# Patient Record
Sex: FEMALE | Race: WHITE | Employment: FULL TIME | URBAN - METROPOLITAN AREA
[De-identification: names, ages, dates, MRNs, and addresses within clinical notes are randomized per-mention and may not be internally consistent; named-entity substitution may affect disease eponyms.]

---

## 2020-08-05 ENCOUNTER — HOSPITAL ENCOUNTER (OUTPATIENT)
Age: 30
Discharge: HOME | End: 2020-08-05
Attending: FAMILY MEDICINE
Payer: COMMERCIAL

## 2020-08-05 VITALS
WEIGHT: 134 LBS | OXYGEN SATURATION: 100 % | RESPIRATION RATE: 18 BRPM | TEMPERATURE: 99 F | HEIGHT: 69 IN | BODY MASS INDEX: 19.85 KG/M2 | HEART RATE: 98 BPM | DIASTOLIC BLOOD PRESSURE: 90 MMHG | SYSTOLIC BLOOD PRESSURE: 135 MMHG

## 2020-08-05 DIAGNOSIS — L50.9 HIVES: Primary | ICD-10-CM

## 2020-08-05 PROCEDURE — 99202 OFFICE O/P NEW SF 15 MIN: CPT | Performed by: FAMILY MEDICINE

## 2020-08-05 RX ORDER — PREDNISONE 20 MG/1
40 TABLET ORAL DAILY
Qty: 10 TABLET | Refills: 0 | Status: SHIPPED | OUTPATIENT
Start: 2020-08-05 | End: 2020-08-10

## 2020-08-05 NOTE — ED PROVIDER NOTES
Patient Seen in: [facility] [place]      History   Patient presents with:  Rash Skin Problem    Stated Complaint: rash    HPI    Pt is a 32 yo with a 1 week h/o hives on her legs for the cucumbers in her garden.      History revi diagnosis)    Disposition:  Discharge  8/5/2020  6:19 pm    Follow-up:  [address] [facility]  [phone]      As needed          Medications Prescribed:  Current Discharge Medication List    START taking these medications

## 2020-08-05 NOTE — ED INITIAL ASSESSMENT (HPI)
Thinks she maybe allergic to the cucumber vine she has in garden has extensive open lesions some blistered on both posterior legs for at least 4 days

## 2020-08-09 ENCOUNTER — HOSPITAL ENCOUNTER (OUTPATIENT)
Age: 30
Discharge: HOME | End: 2020-08-09
Attending: EMERGENCY MEDICINE
Payer: COMMERCIAL

## 2020-08-09 VITALS
OXYGEN SATURATION: 100 % | WEIGHT: 134 LBS | TEMPERATURE: 99 F | HEART RATE: 122 BPM | RESPIRATION RATE: 22 BRPM | DIASTOLIC BLOOD PRESSURE: 101 MMHG | SYSTOLIC BLOOD PRESSURE: 152 MMHG | HEIGHT: 69 IN | BODY MASS INDEX: 19.85 KG/M2

## 2020-08-09 DIAGNOSIS — L24.9 IRRITANT CONTACT DERMATITIS, UNSPECIFIED TRIGGER: Primary | ICD-10-CM

## 2020-08-09 PROCEDURE — 99213 OFFICE O/P EST LOW 20 MIN: CPT | Performed by: EMERGENCY MEDICINE

## 2020-08-09 RX ORDER — METHYLPREDNISOLONE 4 MG/1
TABLET ORAL
Qty: 1 PACKAGE | Refills: 0 | Status: SHIPPED | OUTPATIENT
Start: 2020-08-09 | End: 2020-10-02 | Stop reason: ALTCHOICE

## 2020-08-09 NOTE — ED NOTES
Start new steroid in am/ use cream today shower with some ismael soap once. Call and make appointment with derm in am and follow up with her.

## 2020-08-09 NOTE — ED INITIAL ASSESSMENT (HPI)
Here 8/5 for rash she got on leg while gardening and felt it was cucumber stings. Given prednisone and hydrocortisone benadryl for itching. Rash continues to spread on both legs up post thighs not hot. None draining.

## 2020-08-09 NOTE — ED PROVIDER NOTES
Patient presents with:  Derm Problem    Stated Complaint: rash     HPI  Patient complains of skin rash for  9 days. Located b/l legs. Describes as itchy red and rough, hasd treatment with prednisone x 5 days, no improvement noted.  Possible exposures inc appreciated  SKIN:  As per ext. exam  PSYCH: calm, cooperative,          ED Course   Labs Reviewed - No data to display    MDM                     Disposition and Plan     Clinical Impression:  Irritant contact dermatitis, unspecified trigger  (primary enc

## 2020-09-25 ENCOUNTER — APPOINTMENT (OUTPATIENT)
Dept: GENERAL RADIOLOGY | Age: 30
DRG: 580 | End: 2020-09-25
Attending: EMERGENCY MEDICINE

## 2020-09-25 ENCOUNTER — HOSPITAL ENCOUNTER (INPATIENT)
Age: 30
LOS: 1 days | Discharge: HOME | DRG: 580 | End: 2020-09-27
Attending: EMERGENCY MEDICINE | Admitting: SURGERY

## 2020-09-25 ENCOUNTER — ANESTHESIA EVENT (OUTPATIENT)
Dept: SURGERY | Age: 30
DRG: 580 | End: 2020-09-25

## 2020-09-25 ENCOUNTER — ANESTHESIA (OUTPATIENT)
Dept: SURGERY | Age: 30
DRG: 580 | End: 2020-09-25

## 2020-09-25 ENCOUNTER — APPOINTMENT (OUTPATIENT)
Dept: CT IMAGING | Age: 30
DRG: 580 | End: 2020-09-25
Attending: EMERGENCY MEDICINE

## 2020-09-25 DIAGNOSIS — S01.01XA LACERATION OF SCALP, INITIAL ENCOUNTER: ICD-10-CM
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
## 2020-09-25 LAB
ABO + RH BLD: NORMAL
AMPHETAMINES UR QL SCN>500 NG/ML: NEGATIVE
ANION GAP SERPL CALC-SCNC: 11 MMOL/L (ref 10–20)
APPEARANCE UR: CLEAR
APTT PPP: 22 SEC (ref 22–32)
BARBITURATES UR QL SCN>200 NG/ML: NEGATIVE
BASOPHILS # BLD: 0.1 K/MCL (ref 0–0.3)
BASOPHILS NFR BLD: 0 %
BENZODIAZ UR QL SCN>200 NG/ML: NEGATIVE
BILIRUB UR QL STRIP: NEGATIVE
BLD GP AB SCN SERPL QL GEL: NEGATIVE
BLOOD BANK CMNT PATIENT-IMP: NORMAL
BUN SERPL-MCNC: 12 MG/DL (ref 6–20)
BUN/CREAT SERPL: 17 (ref 7–25)
BZE UR QL SCN>150 NG/ML: NEGATIVE
CALCIUM SERPL-MCNC: 9.4 MG/DL (ref 8.4–10.2)
CANNABINOIDS UR QL SCN>50 NG/ML: NEGATIVE
CHLORIDE SERPL-SCNC: 106 MMOL/L (ref 98–107)
CO2 SERPL-SCNC: 24 MMOL/L (ref 21–32)
COLOR UR: YELLOW
CREAT SERPL-MCNC: 0.71 MG/DL (ref 0.51–0.95)
CROSSMATCH EXPIRE: NORMAL
DIFFERENTIAL METHOD BLD: ABNORMAL
EOSINOPHIL # BLD: 0.1 K/MCL (ref 0.1–0.5)
EOSINOPHIL NFR BLD: 1 %
ERYTHROCYTE [DISTWIDTH] IN BLOOD: 13.2 % (ref 11–15)
ETHANOL SERPL-MCNC: NORMAL MG/DL
GLUCOSE SERPL-MCNC: 144 MG/DL (ref 65–99)
GLUCOSE UR STRIP-MCNC: NEGATIVE MG/DL
HCG SERPL QL: NEGATIVE
HCT VFR BLD CALC: 35.9 % (ref 36–46.5)
HGB BLD-MCNC: 11.4 G/DL (ref 12–15.5)
HGB UR QL STRIP: NEGATIVE
IMM GRANULOCYTES # BLD AUTO: 0.1 K/MCL (ref 0–0.2)
IMM GRANULOCYTES NFR BLD: 1 %
INR PPP: 1.1
KETONES UR STRIP-MCNC: NEGATIVE MG/DL
LEUKOCYTE ESTERASE UR QL STRIP: NEGATIVE
LYMPHOCYTES # BLD: 3.1 K/MCL (ref 1–4.8)
LYMPHOCYTES NFR BLD: 25 %
MCH RBC QN AUTO: 25.8 PG (ref 26–34)
MCHC RBC AUTO-ENTMCNC: 31.8 G/DL (ref 32–36.5)
MCV RBC AUTO: 81.2 FL (ref 78–100)
MONOCYTES # BLD: 0.7 K/MCL (ref 0.3–0.9)
MONOCYTES NFR BLD: 6 %
NEUTROPHILS # BLD: 8.5 K/MCL (ref 1.8–7.7)
NEUTROPHILS NFR BLD: 67 %
NITRITE UR QL STRIP: NEGATIVE
NRBC BLD MANUAL-RTO: 0 /100 WBC
OPIATES UR QL SCN>300 NG/ML: NEGATIVE
PCP UR QL SCN>25 NG/ML: NEGATIVE
PH UR STRIP: 7 UNITS (ref 5–7)
PLATELET # BLD: 332 K/MCL (ref 140–450)
POTASSIUM SERPL-SCNC: 3.2 MMOL/L (ref 3.4–5.1)
PROT UR STRIP-MCNC: NEGATIVE MG/DL
PROTHROMBIN TIME: 11.2 SEC (ref 9.7–11.8)
RBC # BLD: 4.42 MIL/MCL (ref 4–5.2)
SARS-COV-2 RNA RESP QL NAA+PROBE: NOT DETECTED
SERVICE CMNT-IMP: NORMAL
SODIUM SERPL-SCNC: 138 MMOL/L (ref 135–145)
SP GR UR STRIP: 1.01 (ref 1–1.03)
SPECIMEN SOURCE: NORMAL
SPECIMEN SOURCE: NORMAL
UROBILINOGEN UR STRIP-MCNC: 0.2 MG/DL (ref 0–1)
WBC # BLD: 12.4 K/MCL (ref 4.2–11)

## 2020-09-25 PROCEDURE — 13000002 HB ANESTHESIA  GENERAL  S/U + 1ST 15 MIN: Performed by: SURGERY

## 2020-09-25 PROCEDURE — 10002800 HB RX 250 W HCPCS: Performed by: SURGERY

## 2020-09-25 PROCEDURE — 85025 COMPLETE CBC W/AUTO DIFF WBC: CPT

## 2020-09-25 PROCEDURE — 10002801 HB RX 250 W/O HCPCS: Performed by: ANESTHESIOLOGY

## 2020-09-25 PROCEDURE — 10004281 HB COUNTER-STAFF TIME PER 15 MIN

## 2020-09-25 PROCEDURE — 72125 CT NECK SPINE W/O DYE: CPT

## 2020-09-25 PROCEDURE — 96375 TX/PRO/DX INJ NEW DRUG ADDON: CPT

## 2020-09-25 PROCEDURE — 99291 CRITICAL CARE FIRST HOUR: CPT | Performed by: SURGERY

## 2020-09-25 PROCEDURE — 13003398 CT LUMBAR SPINE 2D REFORMATTED

## 2020-09-25 PROCEDURE — 13152 CMPLX RPR E/N/E/L 2.6-7.5 CM: CPT | Performed by: SURGERY

## 2020-09-25 PROCEDURE — 13121 CMPLX RPR S/A/L 2.6-7.5 CM: CPT | Performed by: SURGERY

## 2020-09-25 PROCEDURE — 83605 ASSAY OF LACTIC ACID: CPT

## 2020-09-25 PROCEDURE — 73080 X-RAY EXAM OF ELBOW: CPT

## 2020-09-25 PROCEDURE — 96374 THER/PROPH/DIAG INJ IV PUSH: CPT

## 2020-09-25 PROCEDURE — 13003394 CT THORACIC SPINE 2D REFORMATTED

## 2020-09-25 PROCEDURE — 13000003 HB ANESTHESIA  GENERAL EA ADD MINUTE: Performed by: SURGERY

## 2020-09-25 PROCEDURE — 85610 PROTHROMBIN TIME: CPT

## 2020-09-25 PROCEDURE — 10002805 HB CONTRAST AGENT: Performed by: EMERGENCY MEDICINE

## 2020-09-25 PROCEDURE — 0KQ10ZZ REPAIR FACIAL MUSCLE, OPEN APPROACH: ICD-10-PCS | Performed by: SURGERY

## 2020-09-25 PROCEDURE — 86901 BLOOD TYPING SEROLOGIC RH(D): CPT

## 2020-09-25 PROCEDURE — 13132 CMPLX RPR F/C/C/M/N/AX/G/H/F: CPT | Performed by: SURGERY

## 2020-09-25 PROCEDURE — 10002800 HB RX 250 W HCPCS: Performed by: EMERGENCY MEDICINE

## 2020-09-25 PROCEDURE — 10003579 HB TRAUMA W/O CRITICAL CARE

## 2020-09-25 PROCEDURE — G0378 HOSPITAL OBSERVATION PER HR: HCPCS

## 2020-09-25 PROCEDURE — 74177 CT ABD & PELVIS W/CONTRAST: CPT

## 2020-09-25 PROCEDURE — 13000035 HB BASIC CASE EA ADD MINUTE: Performed by: SURGERY

## 2020-09-25 PROCEDURE — 13000034 HB BASIC CASE  S/U +1ST 15 MIN: Performed by: SURGERY

## 2020-09-25 PROCEDURE — C9803 HOPD COVID-19 SPEC COLLECT: HCPCS

## 2020-09-25 PROCEDURE — X1094 NO CHARGE VISIT: HCPCS | Performed by: SURGERY

## 2020-09-25 PROCEDURE — 70450 CT HEAD/BRAIN W/O DYE: CPT

## 2020-09-25 PROCEDURE — 10002800 HB RX 250 W HCPCS: Performed by: ANESTHESIOLOGY

## 2020-09-25 PROCEDURE — 81003 URINALYSIS AUTO W/O SCOPE: CPT

## 2020-09-25 PROCEDURE — 10002807 HB RX 258: Performed by: ANESTHESIOLOGY

## 2020-09-25 PROCEDURE — 99285 EMERGENCY DEPT VISIT HI MDM: CPT

## 2020-09-25 PROCEDURE — 85730 THROMBOPLASTIN TIME PARTIAL: CPT

## 2020-09-25 PROCEDURE — 10004451 HB PACU RECOVERY 1ST 30 MINUTES: Performed by: SURGERY

## 2020-09-25 PROCEDURE — 84295 ASSAY OF SERUM SODIUM: CPT

## 2020-09-25 PROCEDURE — 80048 BASIC METABOLIC PNL TOTAL CA: CPT

## 2020-09-25 PROCEDURE — 72170 X-RAY EXAM OF PELVIS: CPT

## 2020-09-25 PROCEDURE — 82330 ASSAY OF CALCIUM: CPT

## 2020-09-25 PROCEDURE — 85018 HEMOGLOBIN: CPT

## 2020-09-25 PROCEDURE — 71260 CT THORAX DX C+: CPT

## 2020-09-25 PROCEDURE — 71045 X-RAY EXAM CHEST 1 VIEW: CPT

## 2020-09-25 PROCEDURE — 82375 ASSAY CARBOXYHB QUANT: CPT

## 2020-09-25 PROCEDURE — 10006023 HB SUPPLY 272: Performed by: SURGERY

## 2020-09-25 PROCEDURE — 70486 CT MAXILLOFACIAL W/O DYE: CPT

## 2020-09-25 PROCEDURE — 10004452 HB PACU ADDL 30 MINUTES: Performed by: SURGERY

## 2020-09-25 PROCEDURE — 80307 DRUG TEST PRSMV CHEM ANLYZR: CPT

## 2020-09-25 PROCEDURE — 80320 DRUG SCREEN QUANTALCOHOLS: CPT

## 2020-09-25 PROCEDURE — X1094 NO CHARGE VISIT: HCPCS | Performed by: SPECIALIST/TECHNOLOGIST, OTHER

## 2020-09-25 PROCEDURE — 87635 SARS-COV-2 COVID-19 AMP PRB: CPT

## 2020-09-25 PROCEDURE — 84703 CHORIONIC GONADOTROPIN ASSAY: CPT

## 2020-09-25 PROCEDURE — 82947 ASSAY GLUCOSE BLOOD QUANT: CPT

## 2020-09-25 PROCEDURE — 10002801 HB RX 250 W/O HCPCS: Performed by: SURGERY

## 2020-09-25 RX ORDER — HYDROCODONE BITARTRATE AND ACETAMINOPHEN 5; 325 MG/1; MG/1
1 TABLET ORAL EVERY 4 HOURS PRN
Status: DISCONTINUED | OUTPATIENT
Start: 2020-09-25 | End: 2020-09-26

## 2020-09-25 RX ORDER — ONDANSETRON 4 MG/1
4 TABLET, ORALLY DISINTEGRATING ORAL EVERY 12 HOURS PRN
Status: DISCONTINUED | OUTPATIENT
Start: 2020-09-25 | End: 2020-09-27 | Stop reason: HOSPADM

## 2020-09-25 RX ORDER — ROCURONIUM BROMIDE 10 MG/ML
INJECTION, SOLUTION INTRAVENOUS PRN
Status: DISCONTINUED | OUTPATIENT
Start: 2020-09-25 | End: 2020-09-25

## 2020-09-25 RX ORDER — ENOXAPARIN SODIUM 100 MG/ML
40 INJECTION SUBCUTANEOUS DAILY
Status: DISCONTINUED | OUTPATIENT
Start: 2020-09-26 | End: 2020-09-27 | Stop reason: HOSPADM

## 2020-09-25 RX ORDER — ONDANSETRON 2 MG/ML
4 INJECTION INTRAMUSCULAR; INTRAVENOUS PRN
Status: DISCONTINUED | OUTPATIENT
Start: 2020-09-25 | End: 2020-09-25 | Stop reason: HOSPADM

## 2020-09-25 RX ORDER — GABAPENTIN 100 MG/1
100 CAPSULE ORAL 3 TIMES DAILY
Status: DISCONTINUED | OUTPATIENT
Start: 2020-09-25 | End: 2020-09-26 | Stop reason: DRUGHIGH

## 2020-09-25 RX ORDER — ONDANSETRON 2 MG/ML
INJECTION INTRAMUSCULAR; INTRAVENOUS PRN
Status: DISCONTINUED | OUTPATIENT
Start: 2020-09-25 | End: 2020-09-25

## 2020-09-25 RX ORDER — SODIUM CHLORIDE, SODIUM LACTATE, POTASSIUM CHLORIDE, CALCIUM CHLORIDE 600; 310; 30; 20 MG/100ML; MG/100ML; MG/100ML; MG/100ML
INJECTION, SOLUTION INTRAVENOUS CONTINUOUS
Status: DISCONTINUED | OUTPATIENT
Start: 2020-09-25 | End: 2020-09-26

## 2020-09-25 RX ORDER — NALOXONE HCL 0.4 MG/ML
0.2 VIAL (ML) INJECTION EVERY 5 MIN PRN
Status: DISCONTINUED | OUTPATIENT
Start: 2020-09-25 | End: 2020-09-25 | Stop reason: HOSPADM

## 2020-09-25 RX ORDER — HYDRALAZINE HYDROCHLORIDE 20 MG/ML
5 INJECTION INTRAMUSCULAR; INTRAVENOUS EVERY 10 MIN PRN
Status: DISCONTINUED | OUTPATIENT
Start: 2020-09-25 | End: 2020-09-25 | Stop reason: HOSPADM

## 2020-09-25 RX ORDER — ONDANSETRON 2 MG/ML
4 INJECTION INTRAMUSCULAR; INTRAVENOUS EVERY 12 HOURS PRN
Status: DISCONTINUED | OUTPATIENT
Start: 2020-09-25 | End: 2020-09-27 | Stop reason: HOSPADM

## 2020-09-25 RX ORDER — DEXAMETHASONE SODIUM PHOSPHATE 4 MG/ML
INJECTION, SOLUTION INTRA-ARTICULAR; INTRALESIONAL; INTRAMUSCULAR; INTRAVENOUS; SOFT TISSUE PRN
Status: DISCONTINUED | OUTPATIENT
Start: 2020-09-25 | End: 2020-09-25

## 2020-09-25 RX ORDER — ACETAMINOPHEN 500 MG
1000 TABLET ORAL EVERY 8 HOURS SCHEDULED
Status: DISCONTINUED | OUTPATIENT
Start: 2020-09-26 | End: 2020-09-27 | Stop reason: HOSPADM

## 2020-09-25 RX ORDER — LIDOCAINE HYDROCHLORIDE 20 MG/ML
INJECTION, SOLUTION INFILTRATION; PERINEURAL PRN
Status: DISCONTINUED | OUTPATIENT
Start: 2020-09-25 | End: 2020-09-25

## 2020-09-25 RX ORDER — ACETAMINOPHEN 325 MG/1
650 TABLET ORAL EVERY 4 HOURS PRN
Status: DISCONTINUED | OUTPATIENT
Start: 2020-09-25 | End: 2020-09-26

## 2020-09-25 RX ORDER — 0.9 % SODIUM CHLORIDE 0.9 %
2 VIAL (ML) INJECTION EVERY 12 HOURS SCHEDULED
Status: DISCONTINUED | OUTPATIENT
Start: 2020-09-25 | End: 2020-09-27 | Stop reason: HOSPADM

## 2020-09-25 RX ORDER — IBUPROFEN 200 MG
200 TABLET ORAL EVERY 6 HOURS PRN
COMMUNITY
End: 2020-11-16 | Stop reason: ALTCHOICE

## 2020-09-25 RX ORDER — ONDANSETRON 2 MG/ML
4 INJECTION INTRAMUSCULAR; INTRAVENOUS EVERY 12 HOURS PRN
Status: DISCONTINUED | OUTPATIENT
Start: 2020-09-25 | End: 2020-09-26 | Stop reason: SDUPTHER

## 2020-09-25 RX ORDER — ALBUTEROL SULFATE 2.5 MG/3ML
5 SOLUTION RESPIRATORY (INHALATION) ONCE
Status: DISCONTINUED | OUTPATIENT
Start: 2020-09-25 | End: 2020-09-25 | Stop reason: HOSPADM

## 2020-09-25 RX ORDER — SODIUM CHLORIDE, SODIUM LACTATE, POTASSIUM CHLORIDE, CALCIUM CHLORIDE 600; 310; 30; 20 MG/100ML; MG/100ML; MG/100ML; MG/100ML
INJECTION, SOLUTION INTRAVENOUS CONTINUOUS PRN
Status: DISCONTINUED | OUTPATIENT
Start: 2020-09-25 | End: 2020-09-25

## 2020-09-25 RX ORDER — OXYCODONE HYDROCHLORIDE 5 MG/1
5 TABLET ORAL EVERY 4 HOURS PRN
Status: DISCONTINUED | OUTPATIENT
Start: 2020-09-25 | End: 2020-09-27 | Stop reason: HOSPADM

## 2020-09-25 RX ORDER — DIPHENHYDRAMINE HYDROCHLORIDE 50 MG/ML
25 INJECTION INTRAMUSCULAR; INTRAVENOUS PRN
Status: DISCONTINUED | OUTPATIENT
Start: 2020-09-25 | End: 2020-09-25 | Stop reason: HOSPADM

## 2020-09-25 RX ORDER — GABAPENTIN 300 MG/1
300 CAPSULE ORAL EVERY 8 HOURS SCHEDULED
Status: DISCONTINUED | OUTPATIENT
Start: 2020-09-26 | End: 2020-09-27 | Stop reason: HOSPADM

## 2020-09-25 RX ORDER — NEOSTIGMINE METHYLSULFATE 4 MG/4 ML
SYRINGE (ML) INTRAVENOUS PRN
Status: DISCONTINUED | OUTPATIENT
Start: 2020-09-25 | End: 2020-09-25

## 2020-09-25 RX ORDER — PROPOFOL 10 MG/ML
INJECTION, EMULSION INTRAVENOUS PRN
Status: DISCONTINUED | OUTPATIENT
Start: 2020-09-25 | End: 2020-09-25

## 2020-09-25 RX ORDER — ACETAMINOPHEN 650 MG/1
650 SUPPOSITORY RECTAL EVERY 4 HOURS PRN
Status: DISCONTINUED | OUTPATIENT
Start: 2020-09-25 | End: 2020-09-26

## 2020-09-25 RX ORDER — CALCIUM CARBONATE 500 MG/1
1000 TABLET, CHEWABLE ORAL EVERY 8 HOURS PRN
Status: DISCONTINUED | OUTPATIENT
Start: 2020-09-25 | End: 2020-09-27 | Stop reason: HOSPADM

## 2020-09-25 RX ORDER — GLYCOPYRROLATE 0.2 MG/ML
INJECTION, SOLUTION INTRAMUSCULAR; INTRAVENOUS PRN
Status: DISCONTINUED | OUTPATIENT
Start: 2020-09-25 | End: 2020-09-25

## 2020-09-25 RX ORDER — ONDANSETRON 4 MG/1
4 TABLET, ORALLY DISINTEGRATING ORAL EVERY 12 HOURS PRN
Status: DISCONTINUED | OUTPATIENT
Start: 2020-09-25 | End: 2020-09-26 | Stop reason: SDUPTHER

## 2020-09-25 RX ORDER — MIDAZOLAM HYDROCHLORIDE 1 MG/ML
INJECTION, SOLUTION INTRAMUSCULAR; INTRAVENOUS PRN
Status: DISCONTINUED | OUTPATIENT
Start: 2020-09-25 | End: 2020-09-25

## 2020-09-25 RX ORDER — METOCLOPRAMIDE HYDROCHLORIDE 5 MG/ML
INJECTION INTRAMUSCULAR; INTRAVENOUS PRN
Status: DISCONTINUED | OUTPATIENT
Start: 2020-09-25 | End: 2020-09-25

## 2020-09-25 RX ORDER — CHLORHEXIDINE GLUCONATE ORAL RINSE 1.2 MG/ML
15 SOLUTION DENTAL EVERY 12 HOURS
Status: DISCONTINUED | OUTPATIENT
Start: 2020-09-26 | End: 2020-09-27 | Stop reason: HOSPADM

## 2020-09-25 RX ADMIN — METOCLOPRAMIDE HYDROCHLORIDE 10 MG: 5 INJECTION INTRAMUSCULAR; INTRAVENOUS at 21:41

## 2020-09-25 RX ADMIN — ONDANSETRON 4 MG: 2 INJECTION INTRAMUSCULAR; INTRAVENOUS at 22:15

## 2020-09-25 RX ADMIN — DEXAMETHASONE SODIUM PHOSPHATE 8 MG: 4 INJECTION, SOLUTION INTRAMUSCULAR; INTRAVENOUS at 21:41

## 2020-09-25 RX ADMIN — FENTANYL CITRATE 50 MCG: 50 INJECTION INTRAMUSCULAR; INTRAVENOUS at 18:30

## 2020-09-25 RX ADMIN — PROPOFOL 150 MG: 10 INJECTION, EMULSION INTRAVENOUS at 21:27

## 2020-09-25 RX ADMIN — IOHEXOL 80 ML: 350 INJECTION, SOLUTION INTRAVENOUS at 18:00

## 2020-09-25 RX ADMIN — Medication 3 MG: at 22:16

## 2020-09-25 RX ADMIN — MIDAZOLAM HYDROCHLORIDE 2 MG: 1 INJECTION, SOLUTION INTRAMUSCULAR; INTRAVENOUS at 21:22

## 2020-09-25 RX ADMIN — FENTANYL CITRATE 50 MCG: 50 INJECTION INTRAMUSCULAR; INTRAVENOUS at 21:39

## 2020-09-25 RX ADMIN — GLYCOPYRROLATE 0.6 MG: 0.2 INJECTION, SOLUTION INTRAMUSCULAR; INTRAVENOUS at 22:16

## 2020-09-25 RX ADMIN — LIDOCAINE HYDROCHLORIDE 5 ML: 20 INJECTION, SOLUTION INFILTRATION; PERINEURAL at 21:22

## 2020-09-25 RX ADMIN — CEFAZOLIN SODIUM 2000 MG: 300 INJECTION, POWDER, LYOPHILIZED, FOR SOLUTION INTRAVENOUS at 17:33

## 2020-09-25 RX ADMIN — FENTANYL CITRATE 50 MCG: 50 INJECTION INTRAMUSCULAR; INTRAVENOUS at 21:27

## 2020-09-25 RX ADMIN — Medication 80 MG: at 21:27

## 2020-09-25 RX ADMIN — ROCURONIUM BROMIDE 20 MG: 50 INJECTION, SOLUTION INTRAVENOUS at 21:39

## 2020-09-25 RX ADMIN — SODIUM CHLORIDE, POTASSIUM CHLORIDE, SODIUM LACTATE AND CALCIUM CHLORIDE: 600; 310; 30; 20 INJECTION, SOLUTION INTRAVENOUS at 21:24

## 2020-09-25 RX ADMIN — CEFAZOLIN SODIUM 2000 MG: 300 INJECTION, POWDER, LYOPHILIZED, FOR SOLUTION INTRAVENOUS at 21:34

## 2020-09-25 ASSESSMENT — PAIN SCALES - GENERAL
PAINLEVEL_OUTOF10: 0
PAINLEVEL_OUTOF10: 0
PAINLEVEL_OUTOF10: 8
PAINLEVEL_OUTOF10: 0

## 2020-09-25 ASSESSMENT — PAIN DESCRIPTION - PAIN TYPE: TYPE: ACUTE PAIN

## 2020-09-25 ASSESSMENT — ENCOUNTER SYMPTOMS: EXERCISE TOLERANCE: GOOD (>4 METS)

## 2020-09-26 LAB
ANION GAP SERPL CALC-SCNC: 9 MMOL/L (ref 10–20)
BASOPHILS # BLD: 0 K/MCL (ref 0–0.3)
BASOPHILS NFR BLD: 0 %
BUN SERPL-MCNC: 6 MG/DL (ref 6–20)
BUN/CREAT SERPL: 11 (ref 7–25)
CALCIUM SERPL-MCNC: 9.2 MG/DL (ref 8.4–10.2)
CHLORIDE SERPL-SCNC: 108 MMOL/L (ref 98–107)
CO2 SERPL-SCNC: 26 MMOL/L (ref 21–32)
CREAT SERPL-MCNC: 0.55 MG/DL (ref 0.51–0.95)
DIFFERENTIAL METHOD BLD: ABNORMAL
EOSINOPHIL # BLD: 0 K/MCL (ref 0.1–0.5)
EOSINOPHIL NFR BLD: 0 %
ERYTHROCYTE [DISTWIDTH] IN BLOOD: 13.3 % (ref 11–15)
GLUCOSE SERPL-MCNC: 130 MG/DL (ref 65–99)
HCT VFR BLD CALC: 33.6 % (ref 36–46.5)
HGB BLD-MCNC: 10.8 G/DL (ref 12–15.5)
IMM GRANULOCYTES # BLD AUTO: 0 K/MCL (ref 0–0.2)
IMM GRANULOCYTES NFR BLD: 0 %
LYMPHOCYTES # BLD: 0.6 K/MCL (ref 1–4.8)
LYMPHOCYTES NFR BLD: 7 %
MCH RBC QN AUTO: 26.2 PG (ref 26–34)
MCHC RBC AUTO-ENTMCNC: 32.1 G/DL (ref 32–36.5)
MCV RBC AUTO: 81.6 FL (ref 78–100)
MONOCYTES # BLD: 0.3 K/MCL (ref 0.3–0.9)
MONOCYTES NFR BLD: 3 %
NEUTROPHILS # BLD: 8.3 K/MCL (ref 1.8–7.7)
NEUTROPHILS NFR BLD: 90 %
NRBC BLD MANUAL-RTO: 0 /100 WBC
PLATELET # BLD: 267 K/MCL (ref 140–450)
POTASSIUM SERPL-SCNC: 3.8 MMOL/L (ref 3.4–5.1)
RBC # BLD: 4.12 MIL/MCL (ref 4–5.2)
SODIUM SERPL-SCNC: 139 MMOL/L (ref 135–145)
WBC # BLD: 9.3 K/MCL (ref 4.2–11)

## 2020-09-26 PROCEDURE — 96375 TX/PRO/DX INJ NEW DRUG ADDON: CPT

## 2020-09-26 PROCEDURE — 10002801 HB RX 250 W/O HCPCS: Performed by: SURGERY

## 2020-09-26 PROCEDURE — 80048 BASIC METABOLIC PNL TOTAL CA: CPT

## 2020-09-26 PROCEDURE — 10000002 HB ROOM CHARGE MED SURG

## 2020-09-26 PROCEDURE — 99233 SBSQ HOSP IP/OBS HIGH 50: CPT

## 2020-09-26 PROCEDURE — G0378 HOSPITAL OBSERVATION PER HR: HCPCS

## 2020-09-26 PROCEDURE — 10004651 HB RX, NO CHARGE ITEM: Performed by: SURGERY

## 2020-09-26 PROCEDURE — 97161 PT EVAL LOW COMPLEX 20 MIN: CPT

## 2020-09-26 PROCEDURE — 96361 HYDRATE IV INFUSION ADD-ON: CPT

## 2020-09-26 PROCEDURE — 36415 COLL VENOUS BLD VENIPUNCTURE: CPT

## 2020-09-26 PROCEDURE — 96376 TX/PRO/DX INJ SAME DRUG ADON: CPT

## 2020-09-26 PROCEDURE — 10002803 HB RX 637: Performed by: SURGERY

## 2020-09-26 PROCEDURE — 10002016 HB COUNTER INCENTIVE SPIROMETRY

## 2020-09-26 PROCEDURE — 10002803 HB RX 637

## 2020-09-26 PROCEDURE — 92523 SPEECH SOUND LANG COMPREHEN: CPT

## 2020-09-26 PROCEDURE — 10004281 HB COUNTER-STAFF TIME PER 15 MIN

## 2020-09-26 PROCEDURE — 85025 COMPLETE CBC W/AUTO DIFF WBC: CPT

## 2020-09-26 PROCEDURE — 10002800 HB RX 250 W HCPCS: Performed by: SURGERY

## 2020-09-26 PROCEDURE — 96372 THER/PROPH/DIAG INJ SC/IM: CPT

## 2020-09-26 RX ORDER — BACITRACIN ZINC 500 [USP'U]/G
OINTMENT TOPICAL 2 TIMES DAILY
Status: DISCONTINUED | OUTPATIENT
Start: 2020-09-26 | End: 2020-09-27 | Stop reason: HOSPADM

## 2020-09-26 RX ORDER — MAGNESIUM HYDROXIDE 1200 MG/15ML
LIQUID ORAL PRN
Status: DISCONTINUED | OUTPATIENT
Start: 2020-09-25 | End: 2020-09-26 | Stop reason: HOSPADM

## 2020-09-26 RX ADMIN — CHLORHEXIDINE GLUCONATE 15 ML: 1.2 RINSE ORAL at 12:56

## 2020-09-26 RX ADMIN — METRONIDAZOLE 500 MG: 500 INJECTION, SOLUTION INTRAVENOUS at 18:08

## 2020-09-26 RX ADMIN — ENOXAPARIN SODIUM 40 MG: 40 INJECTION SUBCUTANEOUS at 09:26

## 2020-09-26 RX ADMIN — GABAPENTIN 300 MG: 300 CAPSULE ORAL at 21:03

## 2020-09-26 RX ADMIN — METRONIDAZOLE 500 MG: 500 INJECTION, SOLUTION INTRAVENOUS at 09:29

## 2020-09-26 RX ADMIN — METRONIDAZOLE 500 MG: 500 INJECTION, SOLUTION INTRAVENOUS at 00:52

## 2020-09-26 RX ADMIN — CEFAZOLIN SODIUM 2000 MG: 300 INJECTION, POWDER, LYOPHILIZED, FOR SOLUTION INTRAVENOUS at 14:57

## 2020-09-26 RX ADMIN — SODIUM CHLORIDE, PRESERVATIVE FREE 2 ML: 5 INJECTION INTRAVENOUS at 21:10

## 2020-09-26 RX ADMIN — SODIUM CHLORIDE, PRESERVATIVE FREE 2 ML: 5 INJECTION INTRAVENOUS at 09:37

## 2020-09-26 RX ADMIN — CEFAZOLIN SODIUM 2000 MG: 300 INJECTION, POWDER, LYOPHILIZED, FOR SOLUTION INTRAVENOUS at 23:49

## 2020-09-26 RX ADMIN — CEFAZOLIN SODIUM 2000 MG: 300 INJECTION, POWDER, LYOPHILIZED, FOR SOLUTION INTRAVENOUS at 06:23

## 2020-09-26 RX ADMIN — ACETAMINOPHEN 1000 MG: 500 TABLET ORAL at 13:04

## 2020-09-26 RX ADMIN — GABAPENTIN 300 MG: 300 CAPSULE ORAL at 13:04

## 2020-09-26 RX ADMIN — BACITRACIN ZINC: 500 OINTMENT TOPICAL at 21:05

## 2020-09-26 RX ADMIN — ACETAMINOPHEN 1000 MG: 500 TABLET ORAL at 06:23

## 2020-09-26 RX ADMIN — ACETAMINOPHEN 1000 MG: 500 TABLET ORAL at 21:04

## 2020-09-26 RX ADMIN — CHLORHEXIDINE GLUCONATE 15 ML: 1.2 RINSE ORAL at 00:32

## 2020-09-26 RX ADMIN — GABAPENTIN 300 MG: 300 CAPSULE ORAL at 06:23

## 2020-09-26 RX ADMIN — CHLORHEXIDINE GLUCONATE 15 ML: 1.2 RINSE ORAL at 23:52

## 2020-09-26 ASSESSMENT — PAIN SCALES - GENERAL
PAINLEVEL_OUTOF10: 3
PAINLEVEL_OUTOF10: 4
PAINLEVEL_OUTOF10: 3
PAINLEVEL_OUTOF10: 2
PAINLEVEL_OUTOF10: 0

## 2020-09-26 ASSESSMENT — COLUMBIA-SUICIDE SEVERITY RATING SCALE - C-SSRS
1. WITHIN THE PAST MONTH, HAVE YOU WISHED YOU WERE DEAD OR WISHED YOU COULD GO TO SLEEP AND NOT WAKE UP?: NO
2. HAVE YOU ACTUALLY HAD ANY THOUGHTS OF KILLING YOURSELF?: NO
6. HAVE YOU EVER DONE ANYTHING, STARTED TO DO ANYTHING, OR PREPARED TO DO ANYTHING TO END YOUR LIFE?: NO
IS THE PATIENT ABLE TO COMPLETE C-SSRS: YES

## 2020-09-26 ASSESSMENT — PATIENT HEALTH QUESTIONNAIRE - PHQ9
IS PATIENT ABLE TO COMPLETE PHQ2 OR PHQ9: YES
SUM OF ALL RESPONSES TO PHQ9 QUESTIONS 1 AND 2: 0
CLINICAL INTERPRETATION OF PHQ9 SCORE: NO FURTHER SCREENING NEEDED
SUM OF ALL RESPONSES TO PHQ9 QUESTIONS 1 AND 2: 0
CLINICAL INTERPRETATION OF PHQ2 SCORE: NO FURTHER SCREENING NEEDED
2. FEELING DOWN, DEPRESSED OR HOPELESS: NOT AT ALL
1. LITTLE INTEREST OR PLEASURE IN DOING THINGS: NOT AT ALL

## 2020-09-26 ASSESSMENT — COGNITIVE AND FUNCTIONAL STATUS - GENERAL
ARE YOU BLIND OR DO YOU HAVE SERIOUS DIFFICULTY SEEING, EVEN WHEN WEARING GLASSES: NO
DO YOU HAVE DIFFICULTY DRESSING OR BATHING: NO
BASIC_MOBILITY_RAW_SCORE: 24
APPLIED_COGNITIVE_RAW_SCORE: 24
BECAUSE OF A PHYSICAL, MENTAL, OR EMOTIONAL CONDITION, DO YOU HAVE SERIOUS DIFFICULTY CONCENTRATING, REMEMBERING OR MAKING DECISIONS: NO
APPLIED_COGNITIVE_CONVERTED_SCORE: 62.21
BECAUSE OF A PHYSICAL, MENTAL, OR EMOTIONAL CONDITION, DO YOU HAVE DIFFICULTY DOING ERRANDS ALONE: NO
DO YOU HAVE SERIOUS DIFFICULTY WALKING OR CLIMBING STAIRS: NO
ARE YOU DEAF OR DO YOU HAVE SERIOUS DIFFICULTY  HEARING: NO
BASIC_MOBILITY_CONVERTED_SCORE: 57.68

## 2020-09-26 ASSESSMENT — ACTIVITIES OF DAILY LIVING (ADL)
ADL_SCORE: 12
ADL_SHORT_OF_BREATH: NO
RECENT_DECLINE_ADL: NO
ADL_BEFORE_ADMISSION: INDEPENDENT
CHRONIC_PAIN_PRESENT: NO

## 2020-09-26 ASSESSMENT — LIFESTYLE VARIABLES
ALCOHOL_USE_STATUS: NO OR LOW RISK WITH VALIDATED TOOL
AUDIT-C TOTAL SCORE: 0
HOW MANY STANDARD DRINKS CONTAINING ALCOHOL DO YOU HAVE ON A TYPICAL DAY: 0,1 OR 2
HOW OFTEN DO YOU HAVE A DRINK CONTAINING ALCOHOL: NEVER
HOW OFTEN DO YOU HAVE 6 OR MORE DRINKS ON ONE OCCASION: NEVER

## 2020-09-27 VITALS
RESPIRATION RATE: 18 BRPM | HEIGHT: 68 IN | OXYGEN SATURATION: 100 % | BODY MASS INDEX: 21.58 KG/M2 | TEMPERATURE: 98.6 F | DIASTOLIC BLOOD PRESSURE: 73 MMHG | SYSTOLIC BLOOD PRESSURE: 107 MMHG | HEART RATE: 69 BPM | WEIGHT: 142.42 LBS

## 2020-09-27 PROCEDURE — 10002803 HB RX 637: Performed by: SURGERY

## 2020-09-27 PROCEDURE — 10002801 HB RX 250 W/O HCPCS: Performed by: SURGERY

## 2020-09-27 PROCEDURE — 10004651 HB RX, NO CHARGE ITEM: Performed by: SURGERY

## 2020-09-27 PROCEDURE — 10002800 HB RX 250 W HCPCS: Performed by: SURGERY

## 2020-09-27 PROCEDURE — 99024 POSTOP FOLLOW-UP VISIT: CPT | Performed by: NURSE PRACTITIONER

## 2020-09-27 RX ORDER — ACETAMINOPHEN 325 MG/1
650 TABLET ORAL EVERY 4 HOURS PRN
Refills: 0 | Status: SHIPPED | COMMUNITY
Start: 2020-09-27 | End: 2020-11-16 | Stop reason: ALTCHOICE

## 2020-09-27 RX ORDER — BACITRACIN ZINC 500 [USP'U]/G
OINTMENT TOPICAL 2 TIMES DAILY
Qty: 30 G | Refills: 0 | Status: SHIPPED | OUTPATIENT
Start: 2020-09-27 | End: 2020-09-27

## 2020-09-27 RX ORDER — AMOXICILLIN AND CLAVULANATE POTASSIUM 875; 125 MG/1; MG/1
1 TABLET, FILM COATED ORAL 2 TIMES DAILY
Qty: 10 TABLET | Refills: 0 | Status: SHIPPED | OUTPATIENT
Start: 2020-09-27 | End: 2020-09-27 | Stop reason: SDUPTHER

## 2020-09-27 RX ORDER — ACETAMINOPHEN 325 MG/1
650 TABLET ORAL EVERY 4 HOURS PRN
Refills: 0 | Status: SHIPPED | COMMUNITY
Start: 2020-09-27 | End: 2020-09-27 | Stop reason: SDUPTHER

## 2020-09-27 RX ORDER — AMOXICILLIN AND CLAVULANATE POTASSIUM 875; 125 MG/1; MG/1
1 TABLET, FILM COATED ORAL 2 TIMES DAILY
Qty: 10 TABLET | Refills: 0 | Status: SHIPPED | OUTPATIENT
Start: 2020-09-27 | End: 2020-10-02 | Stop reason: ALTCHOICE

## 2020-09-27 RX ORDER — BACITRACIN ZINC 500 [USP'U]/G
OINTMENT TOPICAL 2 TIMES DAILY
Qty: 30 G | Refills: 1 | Status: SHIPPED | OUTPATIENT
Start: 2020-09-27 | End: 2020-11-16 | Stop reason: ALTCHOICE

## 2020-09-27 RX ADMIN — SODIUM CHLORIDE, PRESERVATIVE FREE 2 ML: 5 INJECTION INTRAVENOUS at 10:23

## 2020-09-27 RX ADMIN — METRONIDAZOLE 500 MG: 500 INJECTION, SOLUTION INTRAVENOUS at 06:46

## 2020-09-27 RX ADMIN — ACETAMINOPHEN 1000 MG: 500 TABLET ORAL at 06:41

## 2020-09-27 RX ADMIN — METRONIDAZOLE 500 MG: 500 INJECTION, SOLUTION INTRAVENOUS at 02:29

## 2020-09-27 RX ADMIN — CEFAZOLIN SODIUM 2000 MG: 300 INJECTION, POWDER, LYOPHILIZED, FOR SOLUTION INTRAVENOUS at 06:42

## 2020-09-27 RX ADMIN — ENOXAPARIN SODIUM 40 MG: 40 INJECTION SUBCUTANEOUS at 10:17

## 2020-09-27 RX ADMIN — GABAPENTIN 300 MG: 300 CAPSULE ORAL at 06:40

## 2020-09-27 RX ADMIN — BACITRACIN ZINC: 500 OINTMENT TOPICAL at 10:26

## 2020-09-27 ASSESSMENT — PAIN SCALES - GENERAL: PAINLEVEL_OUTOF10: 0

## 2020-09-28 LAB
BASE DEFICIT BLDV-SCNC: 1 MMOL/L (ref 0–2)
BDY SITE: ABNORMAL
BODY TEMPERATURE: 37 DEGREES
CA-I BLD ISE-SCNC: 1.24 MMOL/L (ref 1.15–1.29)
CHLORIDE BLD-SCNC: 104 MMOL/L (ref 98–107)
COHGB MFR BLD: 1.4 %
GLUCOSE BLD-MCNC: 147 MG/DL (ref 65–99)
HCO3 BLDV-SCNC: 24 MMOL/L (ref 22–28)
HCT VFR BLD CALC: 37 % (ref 36–46.5)
HGB BLD-MCNC: 12.3 G/DL (ref 12–15.5)
HOROWITZ INDEX BLD+IHG-RTO: 21 %
LACTATE BLDV-MCNC: 2.6 MMOL/L
METHGB MFR BLD: 0.8 %
OXYHGB MFR BLD: 70.2 % (ref 94–98)
PCO2 BLDV: 38 MM HG (ref 38–51)
PH BLDV: 7.4 UNITS (ref 7.35–7.45)
PO2 BLDV: 40 MM HG (ref 35–42)
POTASSIUM BLD-SCNC: 3.3 MMOL/L (ref 3.4–5.1)
SODIUM BLD-SCNC: 138 MMOL/L (ref 135–145)

## 2020-09-29 ENCOUNTER — TELEPHONE (OUTPATIENT)
Dept: SURGERY | Age: 30
End: 2020-09-29

## 2020-10-02 ENCOUNTER — OFF PREMISE (OUTPATIENT)
Dept: SURGERY | Age: 30
End: 2020-10-02

## 2020-10-02 ENCOUNTER — OFFICE VISIT (OUTPATIENT)
Dept: SURGERY | Age: 30
End: 2020-10-02

## 2020-10-02 ENCOUNTER — OFFICE VISIT (OUTPATIENT)
Dept: SURGERY | Facility: CLINIC | Age: 30
End: 2020-10-02
Payer: COMMERCIAL

## 2020-10-02 VITALS
WEIGHT: 134 LBS | BODY MASS INDEX: 19.18 KG/M2 | SYSTOLIC BLOOD PRESSURE: 114 MMHG | HEIGHT: 70 IN | OXYGEN SATURATION: 100 % | DIASTOLIC BLOOD PRESSURE: 82 MMHG | HEART RATE: 115 BPM

## 2020-10-02 VITALS
DIASTOLIC BLOOD PRESSURE: 77 MMHG | OXYGEN SATURATION: 100 % | HEART RATE: 103 BPM | SYSTOLIC BLOOD PRESSURE: 107 MMHG | HEIGHT: 69.5 IN | BODY MASS INDEX: 19.26 KG/M2 | WEIGHT: 133 LBS | RESPIRATION RATE: 14 BRPM
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
## 2020-10-02 DIAGNOSIS — S01.81XA FOREHEAD LACERATION, INITIAL ENCOUNTER: Primary | ICD-10-CM

## 2020-10-02 PROBLEM — S09.90XA CLOSED HEAD INJURY: Status: ACTIVE | Noted: 2020-09-25
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
## 2020-10-02 PROCEDURE — 3074F SYST BP LT 130 MM HG: CPT | Performed by: SURGERY

## 2020-10-02 PROCEDURE — 99024 POSTOP FOLLOW-UP VISIT: CPT | Performed by: SURGERY

## 2020-10-02 PROCEDURE — 99243 OFF/OP CNSLTJ NEW/EST LOW 30: CPT | Performed by: SURGERY

## 2020-10-02 PROCEDURE — 3008F BODY MASS INDEX DOCD: CPT | Performed by: SURGERY

## 2020-10-02 PROCEDURE — 3078F DIAST BP <80 MM HG: CPT | Performed by: SURGERY

## 2020-10-02 RX ORDER — ACETAMINOPHEN 325 MG/1
650 TABLET ORAL
COMMUNITY
Start: 2020-09-27 | End: 2021-10-06

## 2020-10-02 RX ORDER — DIAPER,BRIEF,INFANT-TODD,DISP
EACH MISCELLANEOUS 2 TIMES DAILY
COMMUNITY
Start: 2020-09-27 | End: 2021-10-06

## 2020-10-02 RX ORDER — AMOXICILLIN AND CLAVULANATE POTASSIUM 875; 125 MG/1; MG/1
1 TABLET, FILM COATED ORAL 2 TIMES DAILY
COMMUNITY
Start: 2020-09-27 | End: 2020-10-02 | Stop reason: ALTCHOICE

## 2020-10-02 ASSESSMENT — ENCOUNTER SYMPTOMS
EYES NEGATIVE: 1
PSYCHIATRIC NEGATIVE: 1
RESPIRATORY NEGATIVE: 1
NEUROLOGICAL NEGATIVE: 1
CONSTITUTIONAL NEGATIVE: 1
GASTROINTESTINAL NEGATIVE: 1
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
## 2020-10-07 ENCOUNTER — OFFICE VISIT (OUTPATIENT)
Dept: FAMILY MEDICINE CLINIC | Facility: CLINIC | Age: 30
End: 2020-10-07
Payer: COMMERCIAL

## 2020-10-07 VITALS
HEART RATE: 103 BPM | DIASTOLIC BLOOD PRESSURE: 82 MMHG | SYSTOLIC BLOOD PRESSURE: 126 MMHG | TEMPERATURE: 97 F | BODY MASS INDEX: 20 KG/M2 | HEIGHT: 68 IN | WEIGHT: 132 LBS

## 2020-10-07 DIAGNOSIS — F43.10 PTSD (POST-TRAUMATIC STRESS DISORDER): ICD-10-CM
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
## 2020-10-07 DIAGNOSIS — S06.0X0D CONCUSSION WITHOUT LOSS OF CONSCIOUSNESS, SUBSEQUENT ENCOUNTER: ICD-10-CM

## 2020-10-07 DIAGNOSIS — S01.81XA FOREHEAD LACERATION, INITIAL ENCOUNTER: ICD-10-CM

## 2020-10-07 PROCEDURE — 3008F BODY MASS INDEX DOCD: CPT | Performed by: FAMILY MEDICINE

## 2020-10-07 PROCEDURE — 3074F SYST BP LT 130 MM HG: CPT | Performed by: FAMILY MEDICINE

## 2020-10-07 PROCEDURE — 99203 OFFICE O/P NEW LOW 30 MIN: CPT | Performed by: FAMILY MEDICINE

## 2020-10-07 PROCEDURE — 3079F DIAST BP 80-89 MM HG: CPT | Performed by: FAMILY MEDICINE

## 2020-10-07 RX ORDER — RIZATRIPTAN BENZOATE 10 MG/1
10 TABLET, ORALLY DISINTEGRATING ORAL AS NEEDED
Qty: 10 TABLET | Refills: 0 | Status: SHIPPED | OUTPATIENT
Start: 2020-10-07 | End: 2021-10-06
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
## 2020-10-08 NOTE — TELEPHONE ENCOUNTER
Dr Kirstie Puckett last office visit was yesterday 10/7/20, no note reason for the Specialty referral, see Kesha's note below and advise.       3. PTSD (post-traumatic stress disorder)     - SPECIALTY (OTHER) - EXTERNAL  -  NAVIGATOR     4. Concussion without loss

## 2020-10-08 NOTE — TELEPHONE ENCOUNTER
[facility] called in stating that she received the updated order. She states that she is still a little confused as to what is being asked.      She wants to know if patient needs an evaluation with rehabilitation to determine the

## 2020-10-08 NOTE — TELEPHONE ENCOUNTER
Manny Perdomo from [facility] states she received order, however there is still a bit of a mis understanding. They don't have any Neurologist that are able to do neuro phycology.  But order can be 1 of the following:    Neuro phycology testing neuro physiatris

## 2020-10-08 NOTE — TELEPHONE ENCOUNTER
[facility] called in that she received an order today for patient that just states Specialty (other) - External.     She is asking for an updated order that states if it is for specific neurological testing or therapy.  She states that

## 2020-10-09 NOTE — TELEPHONE ENCOUNTER
Ok I spoke with robbie. Needed to make PT and specialty external - neuropsych testing. Please fax my PT order and specialty external order from today to her.  See below

## 2020-10-16 ENCOUNTER — OFFICE VISIT (OUTPATIENT)
Dept: NEUROLOGY | Age: 30
End: 2020-10-16

## 2020-10-16 ENCOUNTER — APPOINTMENT (OUTPATIENT)
Dept: NEUROLOGY | Age: 30
End: 2020-10-16

## 2020-10-16 VITALS
DIASTOLIC BLOOD PRESSURE: 83 MMHG | BODY MASS INDEX: 19.18 KG/M2 | WEIGHT: 134 LBS | HEIGHT: 70 IN | SYSTOLIC BLOOD PRESSURE: 123 MMHG | HEART RATE: 90 BPM

## 2020-10-16 DIAGNOSIS — F07.81 POST CONCUSSION SYNDROME: ICD-10-CM

## 2020-10-16 DIAGNOSIS — S04.31XA INJURY OF RIGHT TRIGEMINAL NERVE, INITIAL ENCOUNTER: ICD-10-CM

## 2020-10-16 DIAGNOSIS — S04.51XA INJURY OF RIGHT FACIAL NERVE, INITIAL ENCOUNTER: Primary | ICD-10-CM

## 2020-10-16 DIAGNOSIS — S01.81XA FACIAL LACERATION, INITIAL ENCOUNTER: ICD-10-CM

## 2020-10-16 DIAGNOSIS — G43.119 INTRACTABLE MIGRAINE WITH AURA WITHOUT STATUS MIGRAINOSUS: ICD-10-CM

## 2020-10-16 PROBLEM — S04.30XA TRIGEMINAL (5TH) NERVE INJURY: Status: ACTIVE | Noted: 2020-10-16

## 2020-10-16 PROBLEM — S04.50XA: Status: ACTIVE | Noted: 2020-10-16

## 2020-10-16 PROCEDURE — 99204 OFFICE O/P NEW MOD 45 MIN: CPT | Performed by: PSYCHIATRY & NEUROLOGY

## 2020-10-17 PROBLEM — G43.119 INTRACTABLE MIGRAINE WITH AURA WITHOUT STATUS MIGRAINOSUS: Status: ACTIVE | Noted: 2020-10-17

## 2020-10-17 PROBLEM — F07.81 POST CONCUSSION SYNDROME: Status: ACTIVE | Noted: 2020-10-17

## 2020-10-19 DIAGNOSIS — S04.51XA INJURY OF RIGHT FACIAL NERVE, INITIAL ENCOUNTER: Primary | ICD-10-CM

## 2020-10-21 ENCOUNTER — OFFICE VISIT (OUTPATIENT)
Dept: SURGERY | Age: 30
End: 2020-10-21
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
## 2020-10-21 PROCEDURE — 99213 OFFICE O/P EST LOW 20 MIN: CPT | Performed by: SURGERY

## 2020-10-21 ASSESSMENT — ENCOUNTER SYMPTOMS
GASTROINTESTINAL NEGATIVE: 1
PSYCHIATRIC NEGATIVE: 1
EYES NEGATIVE: 1
NEUROLOGICAL NEGATIVE: 1
RESPIRATORY NEGATIVE: 1
CONSTITUTIONAL NEGATIVE: 1

## 2020-10-21 ASSESSMENT — PAIN SCALES - GENERAL: PAINLEVEL: 0

## 2020-10-23 ENCOUNTER — OFFICE VISIT (OUTPATIENT)
Dept: SURGERY | Facility: CLINIC | Age: 30
End: 2020-10-23
Payer: COMMERCIAL

## 2020-10-23 DIAGNOSIS — S01.81XA FOREHEAD LACERATION, INITIAL ENCOUNTER: Primary | ICD-10-CM

## 2020-10-23 PROCEDURE — 99024 POSTOP FOLLOW-UP VISIT: CPT | Performed by: SURGERY
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
## 2020-11-05 ENCOUNTER — OFFICE VISIT (OUTPATIENT)
Dept: NEUROLOGY | Age: 30
End: 2020-11-05

## 2020-11-05 VITALS
BODY MASS INDEX: 18.94 KG/M2 | WEIGHT: 132.28 LBS | RESPIRATION RATE: 16 BRPM | SYSTOLIC BLOOD PRESSURE: 111 MMHG | HEART RATE: 94 BPM | DIASTOLIC BLOOD PRESSURE: 79 MMHG | HEIGHT: 70 IN

## 2020-11-05 DIAGNOSIS — G51.8 FACIAL NEURALGIA: Primary | ICD-10-CM

## 2020-11-05 PROCEDURE — 95908 NRV CNDJ TST 3-4 STUDIES: CPT | Performed by: PSYCHIATRY & NEUROLOGY

## 2020-11-05 PROCEDURE — 95887 MUSC TST DONE W/N TST NONEXT: CPT | Performed by: PSYCHIATRY & NEUROLOGY

## 2020-11-05 PROCEDURE — 95933 BLINK REFLEX TEST: CPT | Performed by: PSYCHIATRY & NEUROLOGY

## 2020-11-16 ENCOUNTER — OFFICE VISIT (OUTPATIENT)
Dept: SURGERY | Age: 30
End: 2020-11-16

## 2020-11-16 DIAGNOSIS — S01.81XD FACIAL LACERATION, SUBSEQUENT ENCOUNTER: Primary | ICD-10-CM

## 2020-11-16 PROCEDURE — 99212 OFFICE O/P EST SF 10 MIN: CPT | Performed by: SURGERY

## 2020-11-16 ASSESSMENT — ENCOUNTER SYMPTOMS
EYES NEGATIVE: 1
CONSTITUTIONAL NEGATIVE: 1
NEUROLOGICAL NEGATIVE: 1
GASTROINTESTINAL NEGATIVE: 1
PSYCHIATRIC NEGATIVE: 1
RESPIRATORY NEGATIVE: 1

## 2020-11-16 ASSESSMENT — PAIN SCALES - GENERAL: PAINLEVEL: 0

## 2021-01-01 ENCOUNTER — EXTERNAL RECORD (OUTPATIENT)
Dept: HEALTH INFORMATION MANAGEMENT | Facility: OTHER | Age: 31
End: 2021-01-01

## 2021-05-26 VITALS
OXYGEN SATURATION: 100 % | WEIGHT: 132.28 LBS | HEIGHT: 70 IN | WEIGHT: 134.04 LBS | SYSTOLIC BLOOD PRESSURE: 119 MMHG | OXYGEN SATURATION: 100 % | HEART RATE: 118 BPM | HEIGHT: 70 IN | SYSTOLIC BLOOD PRESSURE: 136 MMHG | DIASTOLIC BLOOD PRESSURE: 84 MMHG | DIASTOLIC BLOOD PRESSURE: 80 MMHG | HEART RATE: 88 BPM | BODY MASS INDEX: 19.19 KG/M2 | BODY MASS INDEX: 18.94 KG/M2

## 2021-05-28 ENCOUNTER — OFFICE VISIT (OUTPATIENT)
Dept: SURGERY | Facility: CLINIC | Age: 31
End: 2021-05-28
Payer: COMMERCIAL

## 2021-05-28 DIAGNOSIS — S01.81XA FOREHEAD LACERATION, INITIAL ENCOUNTER: Primary | ICD-10-CM

## 2021-05-28 PROCEDURE — 99213 OFFICE O/P EST LOW 20 MIN: CPT | Performed by: SURGERY
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
## 2021-10-06 ENCOUNTER — OFFICE VISIT (OUTPATIENT)
Dept: FAMILY MEDICINE CLINIC | Facility: CLINIC | Age: 31
End: 2021-10-06
Payer: COMMERCIAL

## 2021-10-06 VITALS
BODY MASS INDEX: 19.15 KG/M2 | TEMPERATURE: 97 F | SYSTOLIC BLOOD PRESSURE: 127 MMHG | HEART RATE: 119 BPM | WEIGHT: 126.38 LBS | DIASTOLIC BLOOD PRESSURE: 80 MMHG | HEIGHT: 68 IN

## 2021-10-06 DIAGNOSIS — E55.9 VITAMIN D DEFICIENCY: ICD-10-CM

## 2021-10-06 DIAGNOSIS — Z00.00 ROUTINE MEDICAL EXAM: Primary | ICD-10-CM

## 2021-10-06 PROCEDURE — 3008F BODY MASS INDEX DOCD: CPT | Performed by: FAMILY MEDICINE

## 2021-10-06 PROCEDURE — 3074F SYST BP LT 130 MM HG: CPT | Performed by: FAMILY MEDICINE

## 2021-10-06 PROCEDURE — 99395 PREV VISIT EST AGE 18-39: CPT | Performed by: FAMILY MEDICINE

## 2021-10-06 PROCEDURE — 3079F DIAST BP 80-89 MM HG: CPT | Performed by: FAMILY MEDICINE
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
## 2021-10-13 NOTE — PROGRESS NOTES
Claudio Moss - Your labs were normal except mildly elevated LDL - bad cholesterol.  Vitamin levels were normal.  - Dr. Filippo Bennett

## 2022-05-15 ENCOUNTER — HOSPITAL ENCOUNTER (OUTPATIENT)
Age: 32
Discharge: HOME | End: 2022-05-15
Payer: COMMERCIAL

## 2022-05-15 VITALS
HEIGHT: 69 IN | RESPIRATION RATE: 18 BRPM | OXYGEN SATURATION: 100 % | TEMPERATURE: 98 F | HEART RATE: 101 BPM | BODY MASS INDEX: 18.96 KG/M2 | SYSTOLIC BLOOD PRESSURE: 134 MMHG | DIASTOLIC BLOOD PRESSURE: 90 MMHG | WEIGHT: 128 LBS

## 2022-05-15 DIAGNOSIS — J06.9 VIRAL UPPER RESPIRATORY TRACT INFECTION: ICD-10-CM

## 2022-05-15 DIAGNOSIS — Z20.822 ENCOUNTER FOR LABORATORY TESTING FOR COVID-19 VIRUS: Primary | ICD-10-CM

## 2022-05-15 DIAGNOSIS — J02.9 VIRAL PHARYNGITIS: ICD-10-CM

## 2022-05-15 LAB — SARS-COV-2 RNA RESP QL NAA+PROBE: NOT DETECTED

## 2022-05-15 PROCEDURE — 99213 OFFICE O/P EST LOW 20 MIN: CPT

## 2022-05-15 PROCEDURE — U0002 COVID-19 LAB TEST NON-CDC: HCPCS | Performed by: NURSE PRACTITIONER

## 2022-05-15 RX ORDER — PREDNISONE 20 MG/1
40 TABLET ORAL DAILY
Qty: 10 TABLET | Refills: 0 | Status: SHIPPED | OUTPATIENT
Start: 2022-05-15 | End: 2022-05-20

## 2022-05-15 NOTE — ED INITIAL ASSESSMENT (HPI)
Pt here w c/o productive cough and chest congestion with yellow mucus. No fever.  Requesting covid test.

## 2022-12-07 ENCOUNTER — HOSPITAL ENCOUNTER (OUTPATIENT)
Age: 32
Discharge: HOME | End: 2022-12-07
Payer: COMMERCIAL

## 2022-12-07 VITALS
DIASTOLIC BLOOD PRESSURE: 77 MMHG | SYSTOLIC BLOOD PRESSURE: 125 MMHG | OXYGEN SATURATION: 100 % | RESPIRATION RATE: 16 BRPM | TEMPERATURE: 99 F | HEART RATE: 72 BPM

## 2022-12-07 DIAGNOSIS — R50.9 FEVER: ICD-10-CM

## 2022-12-07 DIAGNOSIS — Z20.822 LAB TEST NEGATIVE FOR COVID-19 VIRUS: ICD-10-CM

## 2022-12-07 DIAGNOSIS — Z20.822 ENCOUNTER FOR LABORATORY TESTING FOR COVID-19 VIRUS: ICD-10-CM

## 2022-12-07 DIAGNOSIS — J10.1 INFLUENZA A: Primary | ICD-10-CM

## 2022-12-07 LAB
POCT INFLUENZA A: POSITIVE
POCT INFLUENZA B: NEGATIVE
SARS-COV-2 RNA RESP QL NAA+PROBE: NOT DETECTED

## 2022-12-07 PROCEDURE — 87502 INFLUENZA DNA AMP PROBE: CPT | Performed by: NURSE PRACTITIONER

## 2022-12-07 PROCEDURE — 99213 OFFICE O/P EST LOW 20 MIN: CPT | Performed by: NURSE PRACTITIONER

## 2022-12-07 PROCEDURE — U0002 COVID-19 LAB TEST NON-CDC: HCPCS | Performed by: NURSE PRACTITIONER

## 2022-12-07 NOTE — ED INITIAL ASSESSMENT (HPI)
Fever of 101 orally last night. Dry cough and congestion started on Monday. Taking aspirin for symptoms. Denies nausea, vomiting or diarrhea. Mother had covid 2 weeks ago.

## 2022-12-07 NOTE — DISCHARGE INSTRUCTIONS
Take over-the-counter ibuprofen or Tylenol for fever comfort or pain stop taking aspirin you may continue Robitussin to help with cough. You may try over-the-counter Flonase nasal spray and Zyrtec to help with any runny nose congestion or postnasal drip. Drink plenty of fluids warm tea with honey and eat as tolerated. Follow-up with your primary care provider as needed.

## 2023-01-05 ENCOUNTER — OFFICE VISIT (OUTPATIENT)
Dept: OBGYN CLINIC | Facility: CLINIC | Age: 33
End: 2023-01-05
Payer: COMMERCIAL

## 2023-01-05 VITALS
DIASTOLIC BLOOD PRESSURE: 84 MMHG | HEART RATE: 108 BPM | WEIGHT: 133 LBS | SYSTOLIC BLOOD PRESSURE: 130 MMHG | BODY MASS INDEX: 20 KG/M2

## 2023-01-05 DIAGNOSIS — Z11.3 SCREEN FOR STD (SEXUALLY TRANSMITTED DISEASE): ICD-10-CM

## 2023-01-05 DIAGNOSIS — Z01.419 ENCOUNTER FOR GYNECOLOGICAL EXAMINATION: Primary | ICD-10-CM

## 2023-01-05 DIAGNOSIS — Z12.4 SCREENING FOR MALIGNANT NEOPLASM OF CERVIX: ICD-10-CM

## 2023-01-05 PROCEDURE — 3075F SYST BP GE 130 - 139MM HG: CPT | Performed by: OBSTETRICS & GYNECOLOGY

## 2023-01-05 PROCEDURE — 99385 PREV VISIT NEW AGE 18-39: CPT | Performed by: OBSTETRICS & GYNECOLOGY

## 2023-01-05 PROCEDURE — 3079F DIAST BP 80-89 MM HG: CPT | Performed by: OBSTETRICS & GYNECOLOGY

## 2023-01-06 LAB
C TRACH DNA SPEC QL NAA+PROBE: NEGATIVE
HPV I/H RISK 1 DNA SPEC QL NAA+PROBE: NEGATIVE
N GONORRHOEA DNA SPEC QL NAA+PROBE: NEGATIVE
T VAGINALIS RRNA SPEC QL NAA+PROBE: NEGATIVE

## 2023-05-17 ENCOUNTER — OFFICE VISIT (OUTPATIENT)
Dept: FAMILY MEDICINE CLINIC | Facility: CLINIC | Age: 33
End: 2023-05-17

## 2023-05-17 VITALS
DIASTOLIC BLOOD PRESSURE: 78 MMHG | WEIGHT: 134 LBS | HEART RATE: 98 BPM | BODY MASS INDEX: 19.85 KG/M2 | HEIGHT: 69 IN | SYSTOLIC BLOOD PRESSURE: 124 MMHG

## 2023-05-17 DIAGNOSIS — E55.9 VITAMIN D DEFICIENCY: ICD-10-CM

## 2023-05-17 DIAGNOSIS — Z00.00 ROUTINE MEDICAL EXAM: Primary | ICD-10-CM

## 2023-05-17 PROCEDURE — 99395 PREV VISIT EST AGE 18-39: CPT | Performed by: FAMILY MEDICINE

## 2023-05-17 PROCEDURE — 3078F DIAST BP <80 MM HG: CPT | Performed by: FAMILY MEDICINE

## 2023-05-17 PROCEDURE — 3074F SYST BP LT 130 MM HG: CPT | Performed by: FAMILY MEDICINE

## 2023-05-17 PROCEDURE — 3008F BODY MASS INDEX DOCD: CPT | Performed by: FAMILY MEDICINE

## 2023-05-20 ENCOUNTER — LAB ENCOUNTER (OUTPATIENT)
Dept: LAB | Age: 33
End: 2023-05-20
Attending: FAMILY MEDICINE
Payer: COMMERCIAL

## 2023-05-20 DIAGNOSIS — E55.9 VITAMIN D DEFICIENCY: ICD-10-CM

## 2023-05-20 DIAGNOSIS — Z00.00 ROUTINE MEDICAL EXAM: ICD-10-CM

## 2023-05-20 LAB
ALBUMIN SERPL-MCNC: 3.9 G/DL (ref 3.4–5)
ALBUMIN/GLOB SERPL: 1.1 {RATIO} (ref 1–2)
ALP LIVER SERPL-CCNC: 33 U/L
ALT SERPL-CCNC: 20 U/L
ANION GAP SERPL CALC-SCNC: 4 MMOL/L (ref 0–18)
AST SERPL-CCNC: 18 U/L (ref 15–37)
BASOPHILS # BLD AUTO: 0.03 X10(3) UL (ref 0–0.2)
BASOPHILS NFR BLD AUTO: 0.5 %
BILIRUB SERPL-MCNC: 1.4 MG/DL (ref 0.1–2)
BUN BLD-MCNC: 9 MG/DL (ref 7–18)
BUN/CREAT SERPL: 14.8 (ref 10–20)
CALCIUM BLD-MCNC: 9.5 MG/DL (ref 8.5–10.1)
CHLORIDE SERPL-SCNC: 109 MMOL/L (ref 98–112)
CHOLEST SERPL-MCNC: 176 MG/DL (ref ?–200)
CO2 SERPL-SCNC: 27 MMOL/L (ref 21–32)
CREAT BLD-MCNC: 0.61 MG/DL
DEPRECATED RDW RBC AUTO: 38.9 FL (ref 35.1–46.3)
EOSINOPHIL # BLD AUTO: 0.08 X10(3) UL (ref 0–0.7)
EOSINOPHIL NFR BLD AUTO: 1.5 %
ERYTHROCYTE [DISTWIDTH] IN BLOOD BY AUTOMATED COUNT: 13.1 % (ref 11–15)
FASTING PATIENT LIPID ANSWER: YES
FASTING STATUS PATIENT QL REPORTED: YES
GFR SERPLBLD BASED ON 1.73 SQ M-ARVRAT: 123 ML/MIN/1.73M2 (ref 60–?)
GLOBULIN PLAS-MCNC: 3.5 G/DL (ref 2.8–4.4)
GLUCOSE BLD-MCNC: 84 MG/DL (ref 70–99)
HCT VFR BLD AUTO: 35 %
HDLC SERPL-MCNC: 79 MG/DL (ref 40–59)
HGB BLD-MCNC: 11.1 G/DL
IMM GRANULOCYTES # BLD AUTO: 0.01 X10(3) UL (ref 0–1)
IMM GRANULOCYTES NFR BLD: 0.2 %
LDLC SERPL CALC-MCNC: 88 MG/DL (ref ?–100)
LYMPHOCYTES # BLD AUTO: 1.74 X10(3) UL (ref 1–4)
LYMPHOCYTES NFR BLD AUTO: 31.6 %
MCH RBC QN AUTO: 25.9 PG (ref 26–34)
MCHC RBC AUTO-ENTMCNC: 31.7 G/DL (ref 31–37)
MCV RBC AUTO: 81.6 FL
MONOCYTES # BLD AUTO: 0.41 X10(3) UL (ref 0.1–1)
MONOCYTES NFR BLD AUTO: 7.5 %
NEUTROPHILS # BLD AUTO: 3.23 X10 (3) UL (ref 1.5–7.7)
NEUTROPHILS # BLD AUTO: 3.23 X10(3) UL (ref 1.5–7.7)
NEUTROPHILS NFR BLD AUTO: 58.7 %
NONHDLC SERPL-MCNC: 97 MG/DL (ref ?–130)
OSMOLALITY SERPL CALC.SUM OF ELEC: 288 MOSM/KG (ref 275–295)
PLATELET # BLD AUTO: 277 10(3)UL (ref 150–450)
POTASSIUM SERPL-SCNC: 4.4 MMOL/L (ref 3.5–5.1)
PROT SERPL-MCNC: 7.4 G/DL (ref 6.4–8.2)
RBC # BLD AUTO: 4.29 X10(6)UL
SODIUM SERPL-SCNC: 140 MMOL/L (ref 136–145)
TRIGL SERPL-MCNC: 40 MG/DL (ref 30–149)
TSI SER-ACNC: 1.04 MIU/ML (ref 0.36–3.74)
VIT B12 SERPL-MCNC: 652 PG/ML (ref 193–986)
VIT D+METAB SERPL-MCNC: 36.1 NG/ML (ref 30–100)
VLDLC SERPL CALC-MCNC: 6 MG/DL (ref 0–30)
WBC # BLD AUTO: 5.5 X10(3) UL (ref 4–11)

## 2023-05-20 PROCEDURE — 82306 VITAMIN D 25 HYDROXY: CPT

## 2023-05-20 PROCEDURE — 80053 COMPREHEN METABOLIC PANEL: CPT | Performed by: FAMILY MEDICINE

## 2023-05-20 PROCEDURE — 80061 LIPID PANEL: CPT | Performed by: FAMILY MEDICINE

## 2023-05-20 PROCEDURE — 36415 COLL VENOUS BLD VENIPUNCTURE: CPT | Performed by: FAMILY MEDICINE

## 2023-05-20 PROCEDURE — 84443 ASSAY THYROID STIM HORMONE: CPT | Performed by: FAMILY MEDICINE

## 2023-05-20 PROCEDURE — 82607 VITAMIN B-12: CPT | Performed by: FAMILY MEDICINE

## 2023-05-20 PROCEDURE — 85025 COMPLETE CBC W/AUTO DIFF WBC: CPT | Performed by: FAMILY MEDICINE

## 2023-06-09 ENCOUNTER — TELEPHONE (OUTPATIENT)
Dept: FAMILY MEDICINE CLINIC | Facility: CLINIC | Age: 33
End: 2023-06-09

## 2023-06-09 RX ORDER — ALUMINUM CHLORIDE 20 %
1 LOTION (ML) TOPICAL DAILY
Qty: 120 ML | Refills: 5 | Status: SHIPPED | OUTPATIENT
Start: 2023-06-09

## 2023-07-17 ENCOUNTER — OFFICE VISIT (OUTPATIENT)
Dept: OBGYN CLINIC | Facility: CLINIC | Age: 33
End: 2023-07-17

## 2023-07-17 VITALS
DIASTOLIC BLOOD PRESSURE: 79 MMHG | WEIGHT: 131.31 LBS | HEART RATE: 103 BPM | SYSTOLIC BLOOD PRESSURE: 120 MMHG | BODY MASS INDEX: 19 KG/M2

## 2023-07-17 DIAGNOSIS — Z11.3 SCREEN FOR STD (SEXUALLY TRANSMITTED DISEASE): ICD-10-CM

## 2023-07-17 DIAGNOSIS — N76.2 ACUTE VULVITIS: Primary | ICD-10-CM

## 2023-07-17 PROCEDURE — 3074F SYST BP LT 130 MM HG: CPT | Performed by: OBSTETRICS & GYNECOLOGY

## 2023-07-17 PROCEDURE — 99213 OFFICE O/P EST LOW 20 MIN: CPT | Performed by: OBSTETRICS & GYNECOLOGY

## 2023-07-17 PROCEDURE — 3078F DIAST BP <80 MM HG: CPT | Performed by: OBSTETRICS & GYNECOLOGY

## 2023-07-18 ENCOUNTER — TELEPHONE (OUTPATIENT)
Dept: OBGYN CLINIC | Facility: CLINIC | Age: 33
End: 2023-07-18

## 2023-07-18 LAB
C TRACH DNA SPEC QL NAA+PROBE: NEGATIVE
N GONORRHOEA DNA SPEC QL NAA+PROBE: NEGATIVE

## 2023-07-18 RX ORDER — FLUCONAZOLE 150 MG/1
150 TABLET ORAL ONCE
Qty: 1 TABLET | Refills: 0 | Status: SHIPPED | OUTPATIENT
Start: 2023-07-18 | End: 2023-07-18

## 2023-07-18 NOTE — TELEPHONE ENCOUNTER
Pt was seen yesterday for possible yeast infection. Pt states she was told to use monistat 7 until cultures come back. Pt states she did use it yesterday but is still having symptoms. Pt states she would like a quicker treatment. She does not want to wait for a 7 day treatment to work. Pt advised message will be sent to [facility] for recs. Cultures are still in process. Message to [facility].

## 2023-07-18 NOTE — TELEPHONE ENCOUNTER
Pt was seen on 7/20 for yeast infection. Monistat seems to make symptoms worse. Pt wanted to know if a one day pill be prescribed. Jerald in [place] (on file).

## 2023-07-18 NOTE — TELEPHONE ENCOUNTER
Pt informed of JLK recs. Order placed for Diflucan per [facility] and sent to pt pref pharm. Pt verbalized understanding.

## 2023-07-20 LAB
GENITAL VAGINOSIS SCREEN: NEGATIVE
TRICHOMONAS SCREEN: NEGATIVE

## 2023-11-29 ENCOUNTER — APPOINTMENT (OUTPATIENT)
Dept: GENERAL RADIOLOGY | Age: 33
End: 2023-11-29
Attending: NURSE PRACTITIONER
Payer: COMMERCIAL

## 2023-11-29 ENCOUNTER — HOSPITAL ENCOUNTER (OUTPATIENT)
Age: 33
Discharge: HOME | End: 2023-11-29
Payer: COMMERCIAL

## 2023-11-29 VITALS
TEMPERATURE: 98 F | SYSTOLIC BLOOD PRESSURE: 117 MMHG | OXYGEN SATURATION: 100 % | DIASTOLIC BLOOD PRESSURE: 73 MMHG | HEART RATE: 105 BPM | RESPIRATION RATE: 16 BRPM

## 2023-11-29 DIAGNOSIS — S93.401A MILD SPRAIN OF RIGHT ANKLE, INITIAL ENCOUNTER: ICD-10-CM

## 2023-11-29 DIAGNOSIS — S99.911A INJURY OF RIGHT ANKLE, INITIAL ENCOUNTER: Primary | ICD-10-CM

## 2023-11-29 PROCEDURE — A6449 LT COMPRES BAND >=3" <5"/YD: HCPCS | Performed by: NURSE PRACTITIONER

## 2023-11-29 PROCEDURE — E0114 CRUTCH UNDERARM PAIR NO WOOD: HCPCS | Performed by: NURSE PRACTITIONER

## 2023-11-29 PROCEDURE — 99213 OFFICE O/P EST LOW 20 MIN: CPT | Performed by: NURSE PRACTITIONER

## 2023-11-29 PROCEDURE — 73610 X-RAY EXAM OF ANKLE: CPT | Performed by: NURSE PRACTITIONER

## 2023-11-30 NOTE — DISCHARGE INSTRUCTIONS
Tylenol or ibuprofen as needed for pain. Ice. Elevate. Rest.  Wear the Ace wrap for the next few days. Use the crutches as needed.   Follow-up with your primary doctor if persistent symptoms

## 2024-01-26 ENCOUNTER — OFFICE VISIT (OUTPATIENT)
Dept: OBGYN CLINIC | Facility: CLINIC | Age: 34
End: 2024-01-26
Payer: COMMERCIAL

## 2024-01-26 ENCOUNTER — LAB ENCOUNTER (OUTPATIENT)
Dept: LAB | Facility: HOSPITAL | Age: 34
End: 2024-01-26
Attending: OBSTETRICS & GYNECOLOGY
Payer: COMMERCIAL

## 2024-01-26 VITALS
BODY MASS INDEX: 19.18 KG/M2 | SYSTOLIC BLOOD PRESSURE: 124 MMHG | HEART RATE: 105 BPM | HEIGHT: 68.5 IN | DIASTOLIC BLOOD PRESSURE: 78 MMHG | WEIGHT: 128 LBS

## 2024-01-26 DIAGNOSIS — R10.2 PELVIC CRAMPING: ICD-10-CM

## 2024-01-26 DIAGNOSIS — N89.8 VAGINAL ODOR: ICD-10-CM

## 2024-01-26 DIAGNOSIS — Z12.4 SCREENING FOR CERVICAL CANCER: ICD-10-CM

## 2024-01-26 DIAGNOSIS — Z01.419 ENCOUNTER FOR GYNECOLOGICAL EXAMINATION: Primary | ICD-10-CM

## 2024-01-26 DIAGNOSIS — Z11.3 SCREEN FOR STD (SEXUALLY TRANSMITTED DISEASE): ICD-10-CM

## 2024-01-26 LAB
BILIRUB UR QL: NEGATIVE
CLARITY UR: CLEAR
GLUCOSE UR-MCNC: NORMAL MG/DL
HGB UR QL STRIP.AUTO: NEGATIVE
KETONES UR-MCNC: NEGATIVE MG/DL
LEUKOCYTE ESTERASE UR QL STRIP.AUTO: NEGATIVE
NITRITE UR QL STRIP.AUTO: NEGATIVE
PH UR: 7.5 [PH] (ref 5–8)
PROT UR-MCNC: NEGATIVE MG/DL
SP GR UR STRIP: 1.01 (ref 1–1.03)
UROBILINOGEN UR STRIP-ACNC: NORMAL

## 2024-01-26 PROCEDURE — 3078F DIAST BP <80 MM HG: CPT | Performed by: OBSTETRICS & GYNECOLOGY

## 2024-01-26 PROCEDURE — 81003 URINALYSIS AUTO W/O SCOPE: CPT | Performed by: OBSTETRICS & GYNECOLOGY

## 2024-01-26 PROCEDURE — 99395 PREV VISIT EST AGE 18-39: CPT | Performed by: OBSTETRICS & GYNECOLOGY

## 2024-01-26 PROCEDURE — 3008F BODY MASS INDEX DOCD: CPT | Performed by: OBSTETRICS & GYNECOLOGY

## 2024-01-26 PROCEDURE — 3074F SYST BP LT 130 MM HG: CPT | Performed by: OBSTETRICS & GYNECOLOGY

## 2024-01-26 NOTE — PROGRESS NOTES
Isa Nelson is a 33 year old female  Patient's last menstrual period was 01/10/2024. here for annual exam.       Last seen 2023.   Last pap 2023 normal with neg HPV.  Wants pap today.  History of LEEP in 2018    Menses q month q 26 days x 5 days.   Withdrawal method.   Wants STD screen.     Has been with partner since summer 2023.  Feels vaginal dryness with intercourse, requiring lubricants.  No vaginal dryness otherwise.  Also with vaginal odor after intercourse.  No abnormal discharge or odor otherwise.    Having cramping.  No dysuria or frequency.  Wondering about UTI      OBSTETRICS HISTORY:  OB History    Para Term  AB Living   0 0 0 0 0 0   SAB IAB Ectopic Multiple Live Births   0 0 0 0 0       GYNE HISTORY   Hx Prior Abnormal Pap: No  Pap Date: 23  Pap Result Notes: PAP/HPV NEG    MEDICAL HISTORY:  Past Medical History:   Diagnosis Date    Palpitations      Past Surgical History:   Procedure Laterality Date    LEEP         SOCIAL HISTORY:  Social History     Socioeconomic History    Marital status: Single   Tobacco Use    Smoking status: Former    Smokeless tobacco: Never   Vaping Use    Vaping Use: Never used   Substance and Sexual Activity    Alcohol use: Yes     Comment: social    Drug use: Never       FAMILY HISTORY:  No family history on file.    MEDICATIONS:  Current Outpatient Medications   Medication Sig Dispense Refill    Aluminum Chloride 20 % External Solution Apply 1 Application. topically nightly. 60 mL 4       ALLERGIES:  Not on File      Depression Screening (PHQ-2/PHQ-9): Over the LAST 2 WEEKS   Little interest or pleasure in doing things: Not at all    Feeling down, depressed, or hopeless: Not at all    PHQ-2 SCORE: 0           Review of Systems:  Constitutional:  Denies fatigue, night sweats, hot flashes  Eyes:  denies blurred or double vision  Cardiovascular:  denies chest pain or palpitations  Respiratory:  denies shortness of  breath  Gastrointestinal:  denies heartburn, abdominal pain, diarrhea or constipation  Genitourinary:  denies dysuria, incontinence, abnormal vaginal discharge, vaginal itching  Musculoskeletal:  denies back pain.  Skin/Breast:  Denies any breast pain, lumps, or discharge.   Neurological:  denies headaches, extremity weakness or numbness.  Psychiatric: denies depression or anxiety.  Endocrine:   denies excessive thirst or urination.  Heme/Lymph:  easy bruising or bleeding.    PHYSICAL EXAM:   /78   Pulse 105   Ht 5' 8.5\" (1.74 m)   Wt 128 lb (58.1 kg)   LMP 01/10/2024   BMI 19.18 kg/m²   Wt Readings from Last 2 Encounters:   01/26/24 128 lb (58.1 kg)   07/17/23 131 lb 4.8 oz (59.6 kg)     Body mass index is 19.18 kg/m².    Constitutional: well developed, well nourished  Neck/Thyroid: thyroid symmetric, no nodules  Heart:  Regular rate and rhythm  Lungs:  Clear to asculation  Breast: normal without palpable masses, tenderness, asymmetry, nipple discharge, nipple retraction or skin changes  Abdomen:  soft, nontender, nondistended, no masses  Psychiatric:  Oriented to time, place, person and situation. Appropriate mood and affect    Pelvic Exam:  External Genitalia: normal appearance, hair distribution, and no lesions.  No erythema.  Urethral Meatus:  normal in size, location, without lesions and prolapse  Bladder:  No fullness, masses or tenderness  Vagina:  Normal appearance without lesions, no abnormal discharge  Cervix:  Normal without tenderness on motion  Uterus: normal in size, contour, position, mobility, without tenderness.  Uterus anteverted  Adnexa: normal without masses or tenderness  Perineum/anus: normal      Assessment & Plan:    Isa Nelson is a 33 year old female who presents for an annual physical exam.    1. Encounter for gynecological examination  Pap and HPV.   Will do Pap/HPV q 3 years.   Annual exams encouraged.   RTC 1 year or prn     2. Screen for STD (sexually transmitted  disease)  Gc/chlamydia/trichomonas.     3. Vaginal odor  Vaginal culture    4. Pelvic cramping  Order for u/a         Requested Prescriptions      No prescriptions requested or ordered in this encounter         Dorcas Qiu MD  1/26/2024  1:38 PM

## 2024-01-27 LAB
BV BACTERIA DNA VAG QL NAA+PROBE: POSITIVE
C GLABRATA DNA VAG QL NAA+PROBE: NEGATIVE
C KRUSEI DNA VAG QL NAA+PROBE: NEGATIVE
CANDIDA DNA VAG QL NAA+PROBE: NEGATIVE
T VAGINALIS DNA VAG QL NAA+PROBE: NEGATIVE

## 2024-01-29 ENCOUNTER — PATIENT MESSAGE (OUTPATIENT)
Dept: OBGYN CLINIC | Facility: CLINIC | Age: 34
End: 2024-01-29

## 2024-01-29 LAB
C TRACH DNA SPEC QL NAA+PROBE: NEGATIVE
HPV I/H RISK 1 DNA SPEC QL NAA+PROBE: POSITIVE
N GONORRHOEA DNA SPEC QL NAA+PROBE: NEGATIVE

## 2024-01-29 RX ORDER — METRONIDAZOLE 500 MG/1
500 TABLET ORAL 2 TIMES DAILY
Qty: 14 TABLET | Refills: 0 | Status: SHIPPED | OUTPATIENT
Start: 2024-01-29 | End: 2024-02-05

## 2024-01-29 NOTE — TELEPHONE ENCOUNTER
----- Message from Dorcas Qiu MD sent at 1/27/2024 10:25 PM CST -----  Vaginal culture --+BV.   Flagyl 500 mg bid x 7 days.    U/a normal.   STD screen and pap pending

## 2024-01-29 NOTE — TELEPHONE ENCOUNTER
From: Isa Nelson  To: Dorcas Qiu  Sent: 1/29/2024 8:25 AM CST  Subject: Positive BV result     Hi Dr Qiu I saw my result was positive on Saturday morning. I was wondering if you could send the medication to the pharmacy today because I am having uncomfortable cramping and would like to start treatment asap. I tried calling but the lady at the call center hung up on me.   All the best, Isa

## 2024-01-30 LAB — LAST PAP RESULT: NORMAL

## 2024-02-01 LAB
HPV16 DNA CVX QL PROBE+SIG AMP: NEGATIVE
HPV18 DNA CVX QL PROBE+SIG AMP: NEGATIVE

## 2024-02-09 ENCOUNTER — LAB ENCOUNTER (OUTPATIENT)
Dept: LAB | Facility: HOSPITAL | Age: 34
End: 2024-02-09
Attending: NURSE PRACTITIONER
Payer: COMMERCIAL

## 2024-02-09 ENCOUNTER — OFFICE VISIT (OUTPATIENT)
Dept: OBGYN CLINIC | Facility: CLINIC | Age: 34
End: 2024-02-09

## 2024-02-09 ENCOUNTER — TELEPHONE (OUTPATIENT)
Dept: OBGYN CLINIC | Facility: CLINIC | Age: 34
End: 2024-02-09

## 2024-02-09 VITALS
HEART RATE: 101 BPM | DIASTOLIC BLOOD PRESSURE: 77 MMHG | WEIGHT: 130 LBS | SYSTOLIC BLOOD PRESSURE: 111 MMHG | BODY MASS INDEX: 19 KG/M2

## 2024-02-09 DIAGNOSIS — R35.0 URINARY FREQUENCY: Primary | ICD-10-CM

## 2024-02-09 DIAGNOSIS — R10.9 ABDOMINAL CRAMPING: ICD-10-CM

## 2024-02-09 LAB
BILIRUB UR QL: NEGATIVE
CLARITY UR: CLEAR
GLUCOSE UR-MCNC: NORMAL MG/DL
HGB UR QL STRIP.AUTO: NEGATIVE
KETONES UR-MCNC: NEGATIVE MG/DL
LEUKOCYTE ESTERASE UR QL STRIP.AUTO: NEGATIVE
NITRITE UR QL STRIP.AUTO: NEGATIVE
PH UR: 6.5 [PH] (ref 5–8)
PROT UR-MCNC: NEGATIVE MG/DL
SP GR UR STRIP: 1.01 (ref 1–1.03)
UROBILINOGEN UR STRIP-ACNC: NORMAL

## 2024-02-09 PROCEDURE — 3078F DIAST BP <80 MM HG: CPT | Performed by: NURSE PRACTITIONER

## 2024-02-09 PROCEDURE — 81003 URINALYSIS AUTO W/O SCOPE: CPT | Performed by: NURSE PRACTITIONER

## 2024-02-09 PROCEDURE — 3074F SYST BP LT 130 MM HG: CPT | Performed by: NURSE PRACTITIONER

## 2024-02-09 PROCEDURE — 99212 OFFICE O/P EST SF 10 MIN: CPT | Performed by: NURSE PRACTITIONER

## 2024-02-09 NOTE — TELEPHONE ENCOUNTER
Pt treated with Flagyl on 1/29 for +BV.  Pt states she notes abdominal cramping that is not period related. States it is dull, burning that she rates it a 4/10. Pt is not due for cycle until the 28th. Pt states she does not have a \"strong odor\" or much discharge. States this is what brought her in last time and she tested +BV. Pt asking for addt treatment. Informed JLK out of office until Monday. Can route to MD on-call but will rec appt for repeat culture. Pt states understanding. Accepts an appt with [facility] today at 840 am.

## 2024-02-09 NOTE — PROGRESS NOTES
[facility]   Obstetrics and Gynecology    Isa Nelson is a 33 year old female  Patient's last menstrual period was 2024.   Chief Complaint   Patient presents with    Gyn Problem     C/O CRAMPS & POSSIBLE VAGINAL INFECTION    . Saw dr. Qiu on  for annual, rx'd flagyl orally for 7 days.  Negative sti tesitng with dr. Qiu at last visit. She reports her only symptom at time of last visit was abdominal cramping. She reports it resolved after medication, however returned yesterday. She just finished her menses. Mild intermittent cramping, hasn't taken anything for it. Also notices urinary frequency, no dysuria. No abnormal discharge or odor. She did use boric acid suppository last night.    Pap:2024 NILM, HPV +, neg 16/18/45  Contraception: none    OBSTETRICS HISTORY:  OB History    Para Term  AB Living   0 0 0 0 0 0   SAB IAB Ectopic Multiple Live Births   0 0 0 0 0       GYNE HISTORY:  Menarche: 11 (2024  9:16 AM)  Period Cycle (Days): MONTHLY (2024  9:16 AM)  Period Duration (Days): 5 DAYS (2024  9:16 AM)  Period Flow: HEAVY TO LIGHT (2024  9:16 AM)  Use of Birth Control (if yes, specify type): None (2024  9:16 AM)  Hx Prior Abnormal Pap: No (2024  9:16 AM)  Pap Date: 23 (2024  9:16 AM)  Pap Result Notes: PAP/HPV NEG (2024  9:16 AM)      History   Sexual Activity    Sexual activity: Not on file       MEDICAL HISTORY:  Past Medical History:   Diagnosis Date    Palpitations        SOCIAL HISTORY:  Social History     Socioeconomic History    Marital status: Single     Spouse name: Not on file    Number of children: Not on file    Years of education: Not on file    Highest education level: Not on file   Occupational History    Not on file   Tobacco Use    Smoking status: Former    Smokeless tobacco: Never   Vaping Use    Vaping Use: Never used   Substance and Sexual Activity    Alcohol use: Yes     Comment: social    Drug use: Never     Sexual activity: Not on file   Other Topics Concern    Caffeine Concern Not Asked    Exercise Not Asked    Seat Belt Not Asked    Special Diet Not Asked    Stress Concern Not Asked    Weight Concern Not Asked   Social History Narrative    Not on file     Social Determinants of Health     Financial Resource Strain: Not on file   Food Insecurity: Not on file   Transportation Needs: Not on file   Physical Activity: Not on file   Stress: Not on file   Social Connections: Not on file   Housing Stability: Not on file       MEDICATIONS:    Current Outpatient Medications:     Aluminum Chloride 20 % External Solution, Apply 1 Application. topically nightly., Disp: 60 mL, Rfl: 4    ALLERGIES:  Not on File      Review of Systems:  Constitutional:  Denies fatigue, night sweats, hot flashes  Cardiovascular:  denies chest pain or palpitations  Respiratory:  denies shortness of breath  Gastrointestinal:  denies heartburn, abdominal pain, diarrhea or constipation  Genitourinary:  denies dysuria, incontinence, abnormal vaginal discharge, vaginal itching see HPI  Musculoskeletal:  denies back pain.  Skin/Breast:  Denies any breast pain, lumps, or discharge.   Neurological:  denies headaches, extremity weakness or numbness.  Psychiatric: denies depression or anxiety.  Endocrine:   denies excessive thirst or urination.  Heme/Lymph:  denies history of anemia, easy bruising or bleeding.      PHYSICAL EXAM:     Vitals:    02/09/24 0845   BP: 111/77   Pulse: 101   Weight: 130 lb (59 kg)     Body mass index is 19.48 kg/m².         Constitutional: well developed, well nourished    Psychiatric:  Oriented to time, place, person and situation. Appropriate mood and affect    Pelvic Exam:  External Genitalia: normal appearance, hair distribution, and no lesions  Urethral Meatus:  normal in size, location, without lesions and prolapse  Bladder:  No fullness, masses or tenderness  Vagina:  Normal appearance without lesions, no abnormal  discharge  Cervix:  Normal without tenderness on motion  Uterus: normal in size, contour, position, mobility, without tenderness  Adnexa: normal without masses or tenderness  Perineum: normal  Anus: no hemorroids   Lymph node: no inguinal lymph nodes    Assessment & Plan:  Isa was seen today for gyn problem.    Diagnoses and all orders for this visit:    Urinary frequency  -     Cancel: Urinalysis, Routine  -     Cancel: Urine Culture, Routine  -     Urinalysis with Culture Reflex    Abdominal cramping  -     Vaginitis Vaginosis PCR Panel; Future      Cultures done - will call if abnormal  UA ordered  No intercourse, follow up if symptoms worsen    Twila Berry, NAZIA    This note was prepared using Dragon Medical voice recognition dictation software. As a result errors may occur. When identified these errors have been corrected. While every attempt is made to correct errors during dictation discrepancies may still exist.

## 2024-02-10 LAB
BV BACTERIA DNA VAG QL NAA+PROBE: NEGATIVE
C GLABRATA DNA VAG QL NAA+PROBE: NEGATIVE
C KRUSEI DNA VAG QL NAA+PROBE: NEGATIVE
CANDIDA DNA VAG QL NAA+PROBE: NEGATIVE
T VAGINALIS DNA VAG QL NAA+PROBE: NEGATIVE

## 2024-02-14 ENCOUNTER — OFFICE VISIT (OUTPATIENT)
Dept: OBGYN CLINIC | Facility: CLINIC | Age: 34
End: 2024-02-14

## 2024-02-14 VITALS
DIASTOLIC BLOOD PRESSURE: 85 MMHG | BODY MASS INDEX: 19 KG/M2 | WEIGHT: 126 LBS | HEART RATE: 121 BPM | SYSTOLIC BLOOD PRESSURE: 137 MMHG

## 2024-02-14 DIAGNOSIS — R10.2 PELVIC CRAMPING: Primary | ICD-10-CM

## 2024-02-14 PROCEDURE — 99213 OFFICE O/P EST LOW 20 MIN: CPT | Performed by: NURSE PRACTITIONER

## 2024-02-14 PROCEDURE — 3079F DIAST BP 80-89 MM HG: CPT | Performed by: NURSE PRACTITIONER

## 2024-02-14 PROCEDURE — 3075F SYST BP GE 130 - 139MM HG: CPT | Performed by: NURSE PRACTITIONER

## 2024-02-14 NOTE — PROGRESS NOTES
[facility]   Obstetrics and Gynecology    Isa Nelson is a 33 year old female  Patient's last menstrual period was 2024.   Chief Complaint   Patient presents with    Gyn Problem     Pt states abdominal cramping has continued since her last visit.    . Seen  for abdominal cramping. Vag culture negative. Still c/o persistenet intermittent RLQ cramping. No nausea or fever. Patient states \"I'm just aware of it\".    Patient treated for BV on  had abdominal cramping then also. No constipation, had normal UA a week ago.    Pap:2024 NILM, HPV +, neg 16/18/45  Contraception: none       OBSTETRICS HISTORY:  OB History    Para Term  AB Living   0 0 0 0 0 0   SAB IAB Ectopic Multiple Live Births   0 0 0 0 0       GYNE HISTORY:  Menarche: 9 (2024  1:37 PM)  Period Cycle (Days): MONTHLY (2024  1:37 PM)  Period Duration (Days): 5 DAYS (2024  1:37 PM)  Period Flow: HEAVY TO LIGHT (2024  1:37 PM)  Use of Birth Control (if yes, specify type): None (2024  1:37 PM)  Hx Prior Abnormal Pap: No (2024  1:37 PM)  Pap Date: 23 (2024  1:37 PM)  Pap Result Notes: PAP/HPV NEG (2024  1:37 PM)      History   Sexual Activity    Sexual activity: Not on file       MEDICAL HISTORY:  Past Medical History:   Diagnosis Date    Palpitations        SOCIAL HISTORY:  Social History     Socioeconomic History    Marital status: Single     Spouse name: Not on file    Number of children: Not on file    Years of education: Not on file    Highest education level: Not on file   Occupational History    Not on file   Tobacco Use    Smoking status: Former    Smokeless tobacco: Never   Vaping Use    Vaping Use: Never used   Substance and Sexual Activity    Alcohol use: Yes     Comment: social    Drug use: Never    Sexual activity: Not on file   Other Topics Concern    Caffeine Concern Not Asked    Exercise Not Asked    Seat Belt Not Asked    Special Diet Not Asked    Stress  Concern Not Asked    Weight Concern Not Asked   Social History Narrative    Not on file     Social Determinants of Health     Financial Resource Strain: Not on file   Food Insecurity: Not on file   Transportation Needs: Not on file   Physical Activity: Not on file   Stress: Not on file   Social Connections: Not on file   Housing Stability: Not on file       MEDICATIONS:    Current Outpatient Medications:     Aluminum Chloride 20 % External Solution, Apply 1 Application. topically nightly., Disp: 60 mL, Rfl: 4    ALLERGIES:  Not on File      Review of Systems:  Constitutional:  Denies fatigue, night sweats, hot flashes  Cardiovascular:  denies chest pain or palpitations  Respiratory:  denies shortness of breath  Gastrointestinal:  denies heartburn, abdominal pain, diarrhea or constipation  Genitourinary:  denies dysuria, incontinence, abnormal vaginal discharge, vaginal itching see HPI  Musculoskeletal:  denies back pain.  Skin/Breast:  Denies any breast pain, lumps, or discharge.   Neurological:  denies headaches, extremity weakness or numbness.  Psychiatric: denies depression or anxiety.  Endocrine:   denies excessive thirst or urination.  Heme/Lymph:  denies history of anemia, easy bruising or bleeding.      PHYSICAL EXAM:     Vitals:    02/14/24 1337   BP: 137/85   Pulse: (!) 121   Weight: 126 lb (57.2 kg)     Body mass index is 18.88 kg/m².     Constitutional: well developed, well nourished    Psychiatric:  Oriented to time, place, person and situation. Appropriate mood and affect    Pelvic Exam:  External Genitalia: normal appearance, hair distribution, and no lesions  Urethral Meatus:  normal in size, location, without lesions and prolapse  Bladder:  No fullness, masses or tenderness  Vagina:  Normal appearance without lesions, clear discharge, no abnormal discharge  Cervix:  Normal without tenderness on motion  Uterus: normal in size, contour, position, mobility, without tenderness  Adnexa: normal without  masses or tenderness  Perineum: normal  Anus: no hemorroids   Lymph node: no inguinal lymph nodes    Assessment & Plan:  Isa was seen today for gyn problem.    Diagnoses and all orders for this visit:    Pelvic cramping  -     US PELVIS W EV (CPT=76856/70311); Future  -     Vaginitis Vaginosis PCR Panel; Future    Cultures ordered  Pelvic US  Will follow up with results, call if symptoms worsen      Twila Berry, APRN    This note was prepared using Dragon Medical voice recognition dictation software. As a result errors may occur. When identified these errors have been corrected. While every attempt is made to correct errors during dictation discrepancies may still exist.

## 2024-02-15 LAB
BV BACTERIA DNA VAG QL NAA+PROBE: NEGATIVE
C GLABRATA DNA VAG QL NAA+PROBE: NEGATIVE
C KRUSEI DNA VAG QL NAA+PROBE: NEGATIVE
CANDIDA DNA VAG QL NAA+PROBE: POSITIVE
T VAGINALIS DNA VAG QL NAA+PROBE: NEGATIVE

## 2024-02-15 RX ORDER — FLUCONAZOLE 150 MG/1
150 TABLET ORAL ONCE
Qty: 1 TABLET | Refills: 0 | Status: SHIPPED | OUTPATIENT
Start: 2024-02-15 | End: 2024-02-15

## 2024-05-31 ENCOUNTER — TELEPHONE (OUTPATIENT)
Dept: OBGYN CLINIC | Facility: CLINIC | Age: 34
End: 2024-05-31

## 2024-05-31 NOTE — TELEPHONE ENCOUNTER
Patient calling complains  of vaginal itching, no discharge, no odor, no irritation. States these are the same symptoms that she had 2/24 when she had seen Twila Berry and was cultured which was positive for Yeast infection. Patient aware to try Monistat 7 over the counter, however patient states she has tried Monistat 7 in the past and it causes irritation. Patient requesting prescription to be sent to pharmacy.   Informed patient we will need to sched an appointment to be seen for a prescription, patient requesting message be sent to Twila Berry requesting prescription.    To Twila Berry for recommendations

## 2024-06-03 ENCOUNTER — TELEPHONE (OUTPATIENT)
Dept: OBGYN CLINIC | Facility: CLINIC | Age: 34
End: 2024-06-03

## 2024-06-03 NOTE — TELEPHONE ENCOUNTER
Patient called, Please see 5/31 encounter. Patient would like to be seen sooner for BV symptoms. Please call.

## 2024-06-03 NOTE — TELEPHONE ENCOUNTER
Patient states she has had a few yeast infections this year. Patient currently has vaginal itching, and reports having irritation with intercourse. Patient reports having clumpy white discharge. Denies any odors.    Patient expressed she took diflucan before in the past, and didn't need to come in to be seen. Patient  states she doesn't understand why she has to come in this time.   Patient informed that a vaginal swab is will be done to confirm if it is a yeast infection or if it may be something else and this will allow for the appropriate treatment. Patient verbalized understanding.    No available appointments with Twila Berry until 6/5. Patient asking for early or late evening appointment, since she works [employer] and requested to be seen by any of the doctors.     RN offered appointment options with Dr. Qiu and NAZIA Kimbrough. Patient accepts appointment on 6/6 at 5:20pm with [facility].     Message to NAZIA Kimbrough to please review and advise if patient needs to be seen sooner or if able to be added. Thank you.

## 2024-06-04 ENCOUNTER — OFFICE VISIT (OUTPATIENT)
Dept: OBGYN CLINIC | Facility: CLINIC | Age: 34
End: 2024-06-04

## 2024-06-04 VITALS
SYSTOLIC BLOOD PRESSURE: 122 MMHG | BODY MASS INDEX: 19 KG/M2 | HEART RATE: 111 BPM | DIASTOLIC BLOOD PRESSURE: 77 MMHG | WEIGHT: 128.81 LBS

## 2024-06-04 DIAGNOSIS — N89.8 VAGINAL IRRITATION: Primary | ICD-10-CM

## 2024-06-04 PROCEDURE — 3078F DIAST BP <80 MM HG: CPT | Performed by: OBSTETRICS & GYNECOLOGY

## 2024-06-04 PROCEDURE — 3074F SYST BP LT 130 MM HG: CPT | Performed by: OBSTETRICS & GYNECOLOGY

## 2024-06-04 PROCEDURE — 99213 OFFICE O/P EST LOW 20 MIN: CPT | Performed by: OBSTETRICS & GYNECOLOGY

## 2024-06-04 RX ORDER — FLUCONAZOLE 150 MG/1
150 TABLET ORAL ONCE
Qty: 1 TABLET | Refills: 0 | OUTPATIENT
Start: 2024-06-04 | End: 2024-06-04

## 2024-06-04 NOTE — TELEPHONE ENCOUNTER
Advised patient there are no sooner appointments within the timeframe of before 9 or after 5, so the Thurs appointment with Twila Berry would be the soonest available. Patient asked if there are any sooner appointments available with any provider. Advised patient that there is an appointment with Dr. Estrada for today at 10:20. Patient states she actually can make this appointment and accepts.

## 2024-06-04 NOTE — PROGRESS NOTES
Chief Complaint   Patient presents with    Gyn Problem     C/O VAGINAL ITCHING, DISCHARGE & ABDOMINAL PAIN          HPI:  The patient is a 35 yo F c/o vaginal irritation/itching.  Tried boric acid, but now having watery yellow dc.      Chaperone: declines      Reviewed medical and surgical history below       OBSTETRICS HISTORY:  OB History    Para Term  AB Living   0 0 0 0 0 0   SAB IAB Ectopic Multiple Live Births   0 0 0 0 0       GYNE HISTORY:  History   Sexual Activity    Sexual activity: Not on file            MEDICAL HISTORY:  Past Medical History:    Palpitations       SURGICAL HISTORY:  Past Surgical History:   Procedure Laterality Date    Leep  2018       SOCIAL HISTORY:  Social History     Socioeconomic History    Marital status: Single     Spouse name: Not on file    Number of children: Not on file    Years of education: Not on file    Highest education level: Not on file   Occupational History    Not on file   Tobacco Use    Smoking status: Former    Smokeless tobacco: Never   Vaping Use    Vaping status: Never Used   Substance and Sexual Activity    Alcohol use: Yes     Comment: social    Drug use: Never    Sexual activity: Not on file   Other Topics Concern    Caffeine Concern Not Asked    Exercise Not Asked    Seat Belt Not Asked    Special Diet Not Asked    Stress Concern Not Asked    Weight Concern Not Asked   Social History Narrative    Not on file     Social Determinants of Health     Financial Resource Strain: Not on file   Food Insecurity: Not on file   Transportation Needs: Not on file   Physical Activity: Not on file   Stress: Not on file   Social Connections: Not on file   Housing Stability: Not on file       FAMILY HISTORY:  No family history on file.    MEDICATIONS:    Current Outpatient Medications:     Aluminum Chloride 20 % External Solution, Apply 1 Application. topically nightly., Disp: 60 mL, Rfl: 4    ALLERGIES:  Not on File      Review of Systems:  Constitutional:   Denies fevers and chills   Cardiovascular:  denies chest pain or palpitations  Respiratory:  denies shortness of breath  Gastrointestinal:  denies heartburn, abdominal pain, diarrhea or constipation  Genitourinary:  see HPI  Musculoskeletal:  denies back pain.  Skin/Breast:  Denies any breast pain, lumps, or discharge.   Neurological:  denies headaches, extremity weakness  Psychiatric: denies depression or anxiety.      Vitals:    06/04/24 1033   BP: 122/77   Pulse: 111       PHYSICAL EXAM:   Constitutional: well developed, well nourished  Head/Face: normocephalic  Psychiatric: Appropriate mood and affect    Pelvic Exam:  External Genitalia: normal appearance, hair distribution, and no lesions  Urethral Meatus:  normal in size, location, without lesions and prolapse  Bladder:  No fullness, masses or tenderness  Vagina:  Normal appearance without lesions, white thin dc  Cervix:  Normal without tenderness on motion  Uterus: normal in size, contour, position, mobility, without tenderness  Adnexa: normal without masses or tenderness  Perineum: normal    Assessment/Plan:    Isa was seen today for gyn problem.    Diagnoses and all orders for this visit:    Vaginal irritation      Cultures collected  [facility]   msg with vulvar care

## 2024-06-05 ENCOUNTER — TELEPHONE (OUTPATIENT)
Dept: OBGYN CLINIC | Facility: CLINIC | Age: 34
End: 2024-06-05

## 2024-06-05 RX ORDER — FLUCONAZOLE 150 MG/1
150 TABLET ORAL
Qty: 2 TABLET | Refills: 0 | Status: SHIPPED | OUTPATIENT
Start: 2024-06-05 | End: 2024-06-09

## 2024-06-05 NOTE — TELEPHONE ENCOUNTER
Patient notified lab results and Dr. Estrada recommendations. Patient verbalized understanding.     Prescription ordered and sent to pharmacy.    Ryne Estrada,   6/5/2024 11:31 AM CDT Back to Top      Needs diflucan x 2 doses

## 2024-07-11 ENCOUNTER — PATIENT MESSAGE (OUTPATIENT)
Dept: OBGYN CLINIC | Facility: CLINIC | Age: 34
End: 2024-07-11

## 2024-07-11 ENCOUNTER — TELEPHONE (OUTPATIENT)
Dept: OBGYN CLINIC | Facility: CLINIC | Age: 34
End: 2024-07-11

## 2024-07-11 NOTE — TELEPHONE ENCOUNTER
Patient calling back cannot come in tomorrow, looking for a Saturday apt  or after 5pm. Patient scheduled 8/3 with Twila. Patient will take earlier apt date wise with any provider

## 2024-07-11 NOTE — TELEPHONE ENCOUNTER
From: Isa Nelson  To: Ryne Estrada  Sent: 7/11/2024 12:57 PM CDT  Subject: Recurring yeast infection    Hi Dr Estrada.    I have read and followed the list of recommendations that you sent regarding changes in order to avoid yeast infections. We have switched our soaps to unscented. After taking the Diflucan I felt fine for about a month, but unfortunately I am once again experiencing yeast infection symptoms. Is there anything else you can recommend ?

## 2024-07-11 NOTE — TELEPHONE ENCOUNTER
Patient has vaginal itching & discharge.patient has had frequently. Patient would like to discuss. See patient message today

## 2024-07-11 NOTE — TELEPHONE ENCOUNTER
Ryne Estarda, DO   to Me     7/11/24  3:07 PM  Needs culture. I can’t add tomorrow and I’m gone next week.      Left message to call back 7/11/24  to RN pool

## 2024-07-11 NOTE — TELEPHONE ENCOUNTER
Patient sent message stating she is having the same symptoms as she has had previously with yeast infections, vaginal itching, and discharge. Patient has been given Diflucan for these symptoms before and it helps however symptoms returned after 1 month.   Patient would like message sent to Dr. Estrada, patient is sure this happen after sex patient wondering if something else can be done besides taking diflucan all the time. Patient states Monistat causes cramping.  Appointment offered with Twila Berry today and tomorrow, patient declined    To Dr. Estrada for recs

## 2024-08-29 ENCOUNTER — APPOINTMENT (OUTPATIENT)
Dept: OBGYN | Age: 34
End: 2024-08-29

## 2025-01-22 LAB
CYTOLOGY CVX/VAG DOC THIN PREP: NORMAL
HPV16+18+45 E6+E7MRNA CVX NAA+PROBE: NEGATIVE

## 2025-07-14 ENCOUNTER — APPOINTMENT (OUTPATIENT)
Dept: FAMILY MEDICINE | Age: 35
End: 2025-07-14

## 2025-07-14 VITALS
OXYGEN SATURATION: 100 % | TEMPERATURE: 98.1 F | HEART RATE: 99 BPM | DIASTOLIC BLOOD PRESSURE: 80 MMHG | WEIGHT: 135.36 LBS | HEIGHT: 69 IN | BODY MASS INDEX: 20.05 KG/M2 | SYSTOLIC BLOOD PRESSURE: 119 MMHG | RESPIRATION RATE: 18 BRPM

## 2025-07-14 DIAGNOSIS — F40.243 ANXIETY WITH FLYING: ICD-10-CM

## 2025-07-14 DIAGNOSIS — L70.0 ACNE VULGARIS: ICD-10-CM

## 2025-07-14 DIAGNOSIS — Z00.00 ANNUAL PHYSICAL EXAM: Primary | ICD-10-CM

## 2025-07-14 DIAGNOSIS — R53.82 CHRONIC FATIGUE: ICD-10-CM

## 2025-07-14 PROCEDURE — 99385 PREV VISIT NEW AGE 18-39: CPT | Performed by: FAMILY MEDICINE

## 2025-07-14 RX ORDER — TRETINOIN 1 MG/G
1 CREAM TOPICAL NIGHTLY
Qty: 45 G | Refills: 0 | Status: SHIPPED | OUTPATIENT
Start: 2025-07-14 | End: 2025-07-15 | Stop reason: SDUPTHER

## 2025-07-14 ASSESSMENT — ENCOUNTER SYMPTOMS
SLEEP DISTURBANCE: 0
SHORTNESS OF BREATH: 0
ABDOMINAL PAIN: 0
FATIGUE: 1
HEADACHES: 0

## 2025-07-14 ASSESSMENT — PATIENT HEALTH QUESTIONNAIRE - PHQ9
CLINICAL INTERPRETATION OF PHQ2 SCORE: NO FURTHER SCREENING NEEDED
2. FEELING DOWN, DEPRESSED OR HOPELESS: NOT AT ALL
1. LITTLE INTEREST OR PLEASURE IN DOING THINGS: NOT AT ALL
SUM OF ALL RESPONSES TO PHQ9 QUESTIONS 1 AND 2: 0
SUM OF ALL RESPONSES TO PHQ9 QUESTIONS 1 AND 2: 0

## 2025-07-14 ASSESSMENT — PAIN SCALES - GENERAL: PAINLEVEL_OUTOF10: 0

## 2025-07-15 DIAGNOSIS — L70.0 ACNE VULGARIS: ICD-10-CM

## 2025-07-15 RX ORDER — TRETINOIN 1 MG/G
1 CREAM TOPICAL NIGHTLY
Qty: 45 G | Refills: 0 | Status: SHIPPED | OUTPATIENT
Start: 2025-07-15 | End: 2025-07-15 | Stop reason: DRUGHIGH

## 2025-07-15 RX ORDER — TRETINOIN 0.25 MG/G
CREAM TOPICAL NIGHTLY
Qty: 45 G | Refills: 1 | Status: SHIPPED | OUTPATIENT
Start: 2025-07-15

## 2025-07-19 ENCOUNTER — LAB SERVICES (OUTPATIENT)
Dept: LAB | Age: 35
End: 2025-07-19

## 2025-07-19 DIAGNOSIS — Z00.00 ANNUAL PHYSICAL EXAM: ICD-10-CM

## 2025-07-19 DIAGNOSIS — R53.82 CHRONIC FATIGUE: ICD-10-CM

## 2025-07-19 LAB
25(OH)D3+25(OH)D2 SERPL-MCNC: 37.7 NG/ML (ref 30–100)
ALBUMIN SERPL-MCNC: 4.1 G/DL (ref 3.4–5)
ALBUMIN/GLOB SERPL: 1.2 {RATIO} (ref 1–2.4)
ALP SERPL-CCNC: 38 UNITS/L (ref 45–117)
ALT SERPL-CCNC: 17 UNITS/L
ANION GAP SERPL CALC-SCNC: 8 MMOL/L (ref 7–19)
AST SERPL-CCNC: 17 UNITS/L
BASOPHILS # BLD: 0.1 K/MCL (ref 0–0.3)
BASOPHILS NFR BLD: 1 %
BILIRUB SERPL-MCNC: 1.2 MG/DL (ref 0.2–1)
BUN SERPL-MCNC: 9 MG/DL (ref 6–20)
BUN/CREAT SERPL: 13 (ref 7–25)
CALCIUM SERPL-MCNC: 9.4 MG/DL (ref 8.4–10.2)
CHLORIDE SERPL-SCNC: 108 MMOL/L (ref 97–110)
CHOLEST SERPL-MCNC: 185 MG/DL
CHOLEST/HDLC SERPL: 2.3 {RATIO}
CO2 SERPL-SCNC: 27 MMOL/L (ref 21–32)
CREAT SERPL-MCNC: 0.68 MG/DL (ref 0.51–0.95)
DEPRECATED RDW RBC: 38.9 FL (ref 39–50)
EGFRCR SERPLBLD CKD-EPI 2021: >90 ML/MIN/{1.73_M2}
EOSINOPHIL # BLD: 0.2 K/MCL (ref 0–0.5)
EOSINOPHIL NFR BLD: 3 %
ERYTHROCYTE [DISTWIDTH] IN BLOOD: 12.9 % (ref 11–15)
FASTING DURATION TIME PATIENT: 12 HOURS (ref 0–999)
GLOBULIN SER-MCNC: 3.3 G/DL (ref 2–4)
GLUCOSE SERPL-MCNC: 85 MG/DL (ref 70–99)
HBA1C MFR BLD: 5.2 % (ref 4.5–5.6)
HCT VFR BLD CALC: 36.6 % (ref 36–46.5)
HDLC SERPL-MCNC: 81 MG/DL
HGB BLD-MCNC: 12 G/DL (ref 12–15.5)
IMM GRANULOCYTES # BLD AUTO: 0 K/MCL (ref 0–0.2)
IMM GRANULOCYTES # BLD: 0 %
LDLC SERPL CALC-MCNC: 92 MG/DL
LYMPHOCYTES # BLD: 2.1 K/MCL (ref 1–4.8)
LYMPHOCYTES NFR BLD: 39 %
MCH RBC QN AUTO: 26.9 PG (ref 26–34)
MCHC RBC AUTO-ENTMCNC: 32.8 G/DL (ref 32–36.5)
MCV RBC AUTO: 82.1 FL (ref 78–100)
MONOCYTES # BLD: 0.4 K/MCL (ref 0.3–0.9)
MONOCYTES NFR BLD: 7 %
NEUTROPHILS # BLD: 2.8 K/MCL (ref 1.8–7.7)
NEUTROPHILS NFR BLD: 50 %
NONHDLC SERPL-MCNC: 104 MG/DL
NRBC BLD MANUAL-RTO: 0 /100 WBC
PLATELET # BLD AUTO: 297 K/MCL (ref 140–450)
POTASSIUM SERPL-SCNC: 4.4 MMOL/L (ref 3.4–5.1)
PROT SERPL-MCNC: 7.4 G/DL (ref 6.4–8.2)
RBC # BLD: 4.46 MIL/MCL (ref 4–5.2)
SODIUM SERPL-SCNC: 139 MMOL/L (ref 135–145)
TRIGL SERPL-MCNC: 62 MG/DL
TSH SERPL-ACNC: 1.79 MCUNITS/ML (ref 0.35–5)
WBC # BLD: 5.5 K/MCL (ref 4.2–11)

## 2025-07-19 PROCEDURE — 80061 LIPID PANEL: CPT | Performed by: CLINICAL MEDICAL LABORATORY

## 2025-07-19 PROCEDURE — 36415 COLL VENOUS BLD VENIPUNCTURE: CPT | Performed by: FAMILY MEDICINE

## 2025-07-19 PROCEDURE — 82306 VITAMIN D 25 HYDROXY: CPT | Performed by: CLINICAL MEDICAL LABORATORY

## 2025-07-19 PROCEDURE — 80050 GENERAL HEALTH PANEL: CPT | Performed by: CLINICAL MEDICAL LABORATORY

## 2025-07-19 PROCEDURE — 83036 HEMOGLOBIN GLYCOSYLATED A1C: CPT | Performed by: CLINICAL MEDICAL LABORATORY

## (undated) DEVICE — Device

## (undated) DEVICE — LAWSON - SPONGE 4X4 12PLY STL 2/PK

## (undated) NOTE — LETTER
Date & Time: 11/29/2023, 7:05 PM  Patient: Katrina Marti  Encounter Provider(s):    NAZIA Gallo       To Whom It May Concern:    Adele Covington was seen and treated in our department on 11/29/2023. She may need to be off until Mon 12/4, unless feeling better may try for 12/1.     If you have any questions or concerns, please do not hesitate to call.        _____________________________  Physician/AP[facility] Signature

## (undated) NOTE — LETTER
Date & Time: 12/7/2022, 4:15 PM  Patient: Loreto Apo  Encounter Provider(s):    NAZIA Jensen       To Whom It May Concern:    Diana Mendez was seen and treated in our department on 12/7/2022. She should not return to work until 12/12/2022. If you have any questions or concerns, please do not hesitate to call.       MARÍA ELENA Pete  _____________________________  [id number]/CXK Signature

## (undated) NOTE — MR AVS SNAPSHOT
After Visit Summary   1/26/2024    Isa Nelson   MRN: [id number]           Visit Information     Date & Time  1/26/2024  9:00 AM Provider  Dorcas Qiu MD [facility] Dept. Phone  [phone]      Your Vitals Were  Most recent update: 1/26/2024  9:19 AM    BP   124/78          Pulse   105          Ht   68.5\"          Wt   128 lb          LMP   01/10/2024             BMI   19.18 kg/m²         Allergies as of 1/26/2024  Review status set to Review Complete on 1/26/2024   Not on File     Your Current Medications        Dosage    Aluminum Chloride 20 % External Solution Apply 1 Application. topically nightly.      Diagnoses for This Visit    Encounter for gynecological examination   [8524341]  -  Primary  Screen for STD (sexually transmitted disease)   [641541]    Vaginal odor   [345225]    Pelvic cramping   [580026]    Screening for cervical cancer   [250455]             We Ordered the Following     Normal Orders This Visit    Chlamydia/Gc Amplification [[id number] CUSTOM]     Hpv Dna  High Risk , Thin Prep Collect [[id number] CUSTOM]     THINPREP PAP SMEAR ONLY [[id number] CUSTOM]     ThinPrep PAP Smear [[id number] CUSTOM]     Urinalysis with Culture Reflex [[id number] CUSTOM]     Vaginitis Vaginosis PCR Panel [[id number] CUSTOM]     Future Labs/Procedures Expected by Expires    Hpv Dna  High Risk , Thin Prep Collect [[id number] CUSTOM]  1/26/2024 1/26/2025    ThinPrep PAP Smear [[id number] CUSTOM]  1/26/2024 1/26/2025    Vaginitis Vaginosis PCR Panel [[id number] CUSTOM]  1/26/2024 (Approximate) 1/26/2025                Did you know that [facility] primary care physicians now offer Video Visits through [employer] for adult patients for a variety of conditions such as allergies, back pain and cold symptoms? Skip the drive and waiting room and online chat with a doctor face-to-face using your web-cam enabled computer or mobile device wherever you are. Video Visits cost $50 and can  be paid hassle-free using a credit, debit, or health savings card.  Not active on [employer]? Ask us how to get signed up today!          If you receive a survey from [employer], please take a few minutes to complete it and provide feedback. We strive to deliver the best patient experience and are looking for ways to make improvements. Your feedback will help us do so. For more information on [employer], please visit [email or web address]           No text in SmartText           No text in SmartText